# Patient Record
Sex: MALE | Race: WHITE | NOT HISPANIC OR LATINO
[De-identification: names, ages, dates, MRNs, and addresses within clinical notes are randomized per-mention and may not be internally consistent; named-entity substitution may affect disease eponyms.]

---

## 2017-08-22 ENCOUNTER — TRANSCRIPTION ENCOUNTER (OUTPATIENT)
Age: 47
End: 2017-08-22

## 2017-08-24 ENCOUNTER — APPOINTMENT (OUTPATIENT)
Dept: INTERNAL MEDICINE | Facility: CLINIC | Age: 47
End: 2017-08-24
Payer: COMMERCIAL

## 2017-08-24 VITALS — BODY MASS INDEX: 26.28 KG/M2 | WEIGHT: 194 LBS | HEIGHT: 72 IN

## 2017-08-24 PROCEDURE — 36415 COLL VENOUS BLD VENIPUNCTURE: CPT

## 2017-08-24 PROCEDURE — 99214 OFFICE O/P EST MOD 30 MIN: CPT | Mod: 25

## 2017-08-28 ENCOUNTER — APPOINTMENT (OUTPATIENT)
Dept: ULTRASOUND IMAGING | Facility: CLINIC | Age: 47
End: 2017-08-28
Payer: COMMERCIAL

## 2017-08-28 ENCOUNTER — OUTPATIENT (OUTPATIENT)
Dept: OUTPATIENT SERVICES | Facility: HOSPITAL | Age: 47
LOS: 1 days | End: 2017-08-28

## 2017-08-28 PROCEDURE — 76700 US EXAM ABDOM COMPLETE: CPT | Mod: 26

## 2017-08-29 ENCOUNTER — TRANSCRIPTION ENCOUNTER (OUTPATIENT)
Age: 47
End: 2017-08-29

## 2017-08-29 LAB
ALBUMIN SERPL ELPH-MCNC: 4.5 G/DL
ALP BLD-CCNC: 49 U/L
ALT SERPL-CCNC: 94 U/L
AMYLASE/CREAT SERPL: 63 U/L
ANION GAP SERPL CALC-SCNC: 15 MMOL/L
AST SERPL-CCNC: 47 U/L
BASOPHILS # BLD AUTO: 0.04 K/UL
BASOPHILS NFR BLD AUTO: 0.6 %
BILIRUB SERPL-MCNC: 1.4 MG/DL
BUN SERPL-MCNC: 12 MG/DL
CALCIUM SERPL-MCNC: 10.1 MG/DL
CHLORIDE SERPL-SCNC: 101 MMOL/L
CMV IGG SERPL QL: <0.2 U/ML
CMV IGG SERPL-IMP: NEGATIVE
CMV IGM SERPL QL: <8 AU/ML
CMV IGM SERPL QL: NEGATIVE
CO2 SERPL-SCNC: 25 MMOL/L
CREAT SERPL-MCNC: 1.16 MG/DL
EOSINOPHIL # BLD AUTO: 0.27 K/UL
EOSINOPHIL NFR BLD AUTO: 4.3 %
GLUCOSE SERPL-MCNC: 93 MG/DL
H PYLORI AB SER-ACNC: 9.3 UNITS
H PYLORI IGA SER-ACNC: 14 UNITS
HAV IGG+IGM SER QL: REACTIVE
HAV IGM SER QL: NONREACTIVE
HBV SURFACE AB SER QL: REACTIVE
HBV SURFACE AG SER QL: NONREACTIVE
HCT VFR BLD CALC: 45.8 %
HCV AB SER QL: NONREACTIVE
HCV S/CO RATIO: 0.13 S/CO
HETEROPH AB SER QL: NEGATIVE
HGB BLD-MCNC: 14.6 G/DL
IMM GRANULOCYTES NFR BLD AUTO: 0 %
LPL SERPL-CCNC: 47 U/L
LYMPHOCYTES # BLD AUTO: 2.24 K/UL
LYMPHOCYTES NFR BLD AUTO: 35.9 %
MAN DIFF?: NORMAL
MCHC RBC-ENTMCNC: 29.7 PG
MCHC RBC-ENTMCNC: 31.9 GM/DL
MCV RBC AUTO: 93.1 FL
MONOCYTES # BLD AUTO: 0.39 K/UL
MONOCYTES NFR BLD AUTO: 6.3 %
NEUTROPHILS # BLD AUTO: 3.3 K/UL
NEUTROPHILS NFR BLD AUTO: 52.9 %
PLATELET # BLD AUTO: 238 K/UL
POTASSIUM SERPL-SCNC: 4.9 MMOL/L
PROT SERPL-MCNC: 8 G/DL
RBC # BLD: 4.92 M/UL
RBC # FLD: 14.3 %
SODIUM SERPL-SCNC: 141 MMOL/L
WBC # FLD AUTO: 6.24 K/UL

## 2017-09-15 ENCOUNTER — RX RENEWAL (OUTPATIENT)
Age: 47
End: 2017-09-15

## 2017-09-16 ENCOUNTER — MEDICATION RENEWAL (OUTPATIENT)
Age: 47
End: 2017-09-16

## 2017-11-30 ENCOUNTER — RX RENEWAL (OUTPATIENT)
Age: 47
End: 2017-11-30

## 2017-12-05 ENCOUNTER — MESSAGE (OUTPATIENT)
Age: 47
End: 2017-12-05

## 2017-12-13 ENCOUNTER — APPOINTMENT (OUTPATIENT)
Dept: PULMONOLOGY | Facility: CLINIC | Age: 47
End: 2017-12-13
Payer: COMMERCIAL

## 2017-12-13 VITALS
OXYGEN SATURATION: 97 % | DIASTOLIC BLOOD PRESSURE: 60 MMHG | SYSTOLIC BLOOD PRESSURE: 122 MMHG | HEIGHT: 72 IN | BODY MASS INDEX: 26.14 KG/M2 | TEMPERATURE: 96.9 F | WEIGHT: 193 LBS | RESPIRATION RATE: 15 BRPM | HEART RATE: 66 BPM

## 2017-12-13 PROCEDURE — 99243 OFF/OP CNSLTJ NEW/EST LOW 30: CPT | Mod: GC

## 2018-01-03 ENCOUNTER — RESULT REVIEW (OUTPATIENT)
Age: 48
End: 2018-01-03

## 2018-01-03 ENCOUNTER — APPOINTMENT (OUTPATIENT)
Dept: SLEEP CENTER | Facility: CLINIC | Age: 48
End: 2018-01-03
Payer: COMMERCIAL

## 2018-01-03 PROCEDURE — G0399: CPT | Mod: 26

## 2018-01-04 ENCOUNTER — OUTPATIENT (OUTPATIENT)
Dept: OUTPATIENT SERVICES | Facility: HOSPITAL | Age: 48
LOS: 1 days | End: 2018-01-04
Payer: COMMERCIAL

## 2018-01-04 PROCEDURE — G0399: CPT

## 2018-01-08 ENCOUNTER — RESULT REVIEW (OUTPATIENT)
Age: 48
End: 2018-01-08

## 2018-01-09 DIAGNOSIS — G47.33 OBSTRUCTIVE SLEEP APNEA (ADULT) (PEDIATRIC): ICD-10-CM

## 2018-03-23 ENCOUNTER — APPOINTMENT (OUTPATIENT)
Dept: PULMONOLOGY | Facility: CLINIC | Age: 48
End: 2018-03-23
Payer: COMMERCIAL

## 2018-03-23 VITALS
OXYGEN SATURATION: 98 % | DIASTOLIC BLOOD PRESSURE: 70 MMHG | SYSTOLIC BLOOD PRESSURE: 100 MMHG | BODY MASS INDEX: 26.11 KG/M2 | HEIGHT: 73 IN | WEIGHT: 197 LBS | TEMPERATURE: 97.4 F | RESPIRATION RATE: 18 BRPM | HEART RATE: 76 BPM

## 2018-03-23 PROCEDURE — 99214 OFFICE O/P EST MOD 30 MIN: CPT | Mod: GC

## 2018-06-12 ENCOUNTER — RX RENEWAL (OUTPATIENT)
Age: 48
End: 2018-06-12

## 2018-09-06 ENCOUNTER — CHART COPY (OUTPATIENT)
Age: 48
End: 2018-09-06

## 2018-10-09 ENCOUNTER — RX RENEWAL (OUTPATIENT)
Age: 48
End: 2018-10-09

## 2018-10-22 ENCOUNTER — TRANSCRIPTION ENCOUNTER (OUTPATIENT)
Age: 48
End: 2018-10-22

## 2018-12-12 ENCOUNTER — RX RENEWAL (OUTPATIENT)
Age: 48
End: 2018-12-12

## 2018-12-13 RX ORDER — LEVOTHYROXINE SODIUM 0.15 MG/1
150 TABLET ORAL DAILY
Qty: 90 | Refills: 3 | Status: DISCONTINUED | COMMUNITY
Start: 2018-12-12 | End: 2018-12-13

## 2018-12-27 ENCOUNTER — RX RENEWAL (OUTPATIENT)
Age: 48
End: 2018-12-27

## 2019-01-03 ENCOUNTER — RX RENEWAL (OUTPATIENT)
Age: 49
End: 2019-01-03

## 2019-01-08 ENCOUNTER — RX RENEWAL (OUTPATIENT)
Age: 49
End: 2019-01-08

## 2019-01-11 ENCOUNTER — RX RENEWAL (OUTPATIENT)
Age: 49
End: 2019-01-11

## 2019-02-27 ENCOUNTER — MESSAGE (OUTPATIENT)
Age: 49
End: 2019-02-27

## 2019-02-28 ENCOUNTER — TRANSCRIPTION ENCOUNTER (OUTPATIENT)
Age: 49
End: 2019-02-28

## 2019-03-04 LAB
T4 FREE SERPL-MCNC: 1.5 NG/DL
TSH SERPL-ACNC: 3.54 UIU/ML

## 2019-03-20 ENCOUNTER — OTHER (OUTPATIENT)
Age: 49
End: 2019-03-20

## 2019-03-31 ENCOUNTER — LABORATORY RESULT (OUTPATIENT)
Age: 49
End: 2019-03-31

## 2019-04-01 ENCOUNTER — TRANSCRIPTION ENCOUNTER (OUTPATIENT)
Age: 49
End: 2019-04-01

## 2019-04-08 ENCOUNTER — NON-APPOINTMENT (OUTPATIENT)
Age: 49
End: 2019-04-08

## 2019-04-08 ENCOUNTER — APPOINTMENT (OUTPATIENT)
Dept: INTERNAL MEDICINE | Facility: CLINIC | Age: 49
End: 2019-04-08
Payer: COMMERCIAL

## 2019-04-08 VITALS
BODY MASS INDEX: 24.92 KG/M2 | DIASTOLIC BLOOD PRESSURE: 68 MMHG | SYSTOLIC BLOOD PRESSURE: 100 MMHG | HEART RATE: 75 BPM | WEIGHT: 188 LBS | OXYGEN SATURATION: 95 % | TEMPERATURE: 98.6 F | HEIGHT: 73 IN

## 2019-04-08 VITALS — BODY MASS INDEX: 25.19 KG/M2 | WEIGHT: 190.9 LBS

## 2019-04-08 DIAGNOSIS — G47.33 OBSTRUCTIVE SLEEP APNEA (ADULT) (PEDIATRIC): ICD-10-CM

## 2019-04-08 PROCEDURE — 99396 PREV VISIT EST AGE 40-64: CPT | Mod: 25

## 2019-04-08 PROCEDURE — G0444 DEPRESSION SCREEN ANNUAL: CPT

## 2019-04-08 PROCEDURE — 82270 OCCULT BLOOD FECES: CPT

## 2019-04-08 PROCEDURE — 36415 COLL VENOUS BLD VENIPUNCTURE: CPT

## 2019-04-08 RX ORDER — VALACYCLOVIR 500 MG/1
500 TABLET, FILM COATED ORAL
Qty: 10 | Refills: 0 | Status: COMPLETED | COMMUNITY
Start: 2018-12-15 | End: 2019-04-08

## 2019-04-08 RX ORDER — LEVOTHYROXINE SODIUM 0.15 MG/1
150 TABLET ORAL
Qty: 135 | Refills: 2 | Status: COMPLETED | COMMUNITY
Start: 2019-03-24 | End: 2019-04-08

## 2019-04-09 NOTE — HEALTH RISK ASSESSMENT
[Excellent] : ~his/her~  mood as  excellent [No falls in past year] : Patient reported no falls in the past year [0] : 2) Feeling down, depressed, or hopeless: Not at all (0) [HIV test declined] : HIV test declined [Hepatitis C test declined] : Hepatitis C test declined [None] : None [With Family] : lives with family [Employed] : employed [] :  [Sexually Active] : sexually active [Feels Safe at Home] : Feels safe at home [Fully functional (bathing, dressing, toileting, transferring, walking, feeding)] : Fully functional (bathing, dressing, toileting, transferring, walking, feeding) [Fully functional (using the telephone, shopping, preparing meals, housekeeping, doing laundry, using] : Fully functional and needs no help or supervision to perform IADLs (using the telephone, shopping, preparing meals, housekeeping, doing laundry, using transportation, managing medications and managing finances) [Reports changes in hearing] : Reports changes in hearing [Smoke Detector] : smoke detector [Carbon Monoxide Detector] : carbon monoxide detector [Seat Belt] :  uses seat belt [Sunscreen] : uses sunscreen [With Patient/Caregiver] : With Patient/Caregiver [de-identified] : Social EtOH [] : No [[id number]] : 0 [de-identified] : Gym - 4-5x/week, running 2 miles vs gym in warm weather. 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 [de-identified] : No vision correction.  Last eye exam about 2016.  Last visit about 3 years ago. [de-identified] : Going regularly. [AdvancecareDate] : 04/08/2019 [FreeTextEntry4] : Patient reports having a written Health Care Proxy and/or Living Will and will forward a copy.

## 2019-04-09 NOTE — PHYSICAL EXAM
[Well Nourished] : well nourished [No Acute Distress] : no acute distress [Well-Appearing] : well-appearing [Well Developed] : well developed [Normal Voice/Communication] : normal voice/communication [Normal Sclera/Conjunctiva] : normal sclera/conjunctiva [EOMI] : extraocular movements intact [PERRL] : pupils equal round and reactive to light [Normal Outer Ear/Nose] : the outer ears and nose were normal in appearance [Normal TMs] : both tympanic membranes were normal [Normal Oropharynx] : the oropharynx was normal [No JVD] : no jugular venous distention [No Lymphadenopathy] : no lymphadenopathy [Supple] : supple [No Respiratory Distress] : no respiratory distress  [Thyroid Normal, No Nodules] : the thyroid was normal and there were no nodules present [Normal Rate] : normal rate  [No Accessory Muscle Use] : no accessory muscle use [Clear to Auscultation] : lungs were clear to auscultation bilaterally [Normal S1, S2] : normal S1 and S2 [Regular Rhythm] : with a regular rhythm [No Murmur] : no murmur heard [No Abdominal Bruit] : a ~M bruit was not heard ~T in the abdomen [No Varicosities] : no varicosities [No Carotid Bruits] : no carotid bruits [Pedal Pulses Present] : the pedal pulses are present [No Edema] : there was no peripheral edema [No Extremity Clubbing/Cyanosis] : no extremity clubbing/cyanosis [Soft] : abdomen soft [Non Tender] : non-tender [No Palpable Aorta] : no palpable aorta [Non-distended] : non-distended [No HSM] : no HSM [No Masses] : no abdominal mass palpated [No Hernias] : no hernias [Normal Sphincter Tone] : normal sphincter tone [Normal Bowel Sounds] : normal bowel sounds [No Mass] : no mass [Urethral Meatus] : meatus normal [Penis Abnormality] : normal circumcised penis [Urinary Bladder Findings] : the bladder was normal on palpation [Scrotum] : the scrotum was normal [Prostate Enlargement] : the prostate was not enlarged [Testes Mass (___cm)] : there were no testicular masses [No Prostate Nodules] : no prostate nodules [Normal Posterior Cervical Nodes] : no posterior cervical lymphadenopathy [Normal Anterior Cervical Nodes] : no anterior cervical lymphadenopathy [No CVA Tenderness] : no CVA  tenderness [No Spinal Tenderness] : no spinal tenderness [Grossly Normal Strength/Tone] : grossly normal strength/tone [No Joint Swelling] : no joint swelling [Coordination Grossly Intact] : coordination grossly intact [No Rash] : no rash [Normal Gait] : normal gait [Deep Tendon Reflexes (DTR)] : deep tendon reflexes were 2+ and symmetric [No Focal Deficits] : no focal deficits [Normal Affect] : the affect was normal [Normal Insight/Judgement] : insight and judgment were intact [Stool Occult Blood] : stool negative for occult blood

## 2019-04-09 NOTE — ASSESSMENT
[FreeTextEntry1] : (1) HCM - discussed diet, exercise, weight maintenance.  Labs ordered in office as below.  Patient offered HIV testing and declined today (it had been done several years ago and was negative).  Patient received the influenza vaccination this past season.  Tdap UTD from 4/26/13.  Patient reports that he just prepared advance directives, and he will forward a copy to me.\par \par (2) Sleep - patient with h/o sleep apnea and uses CPAP.  Follow-up with Dr. Calzada as directed.\par \par (3) Hypothyroidism - continue levothyroxine.  Patient had been self adjusting the medication, but now has an appointment to see Dr. Michelle Kruger (Endocrine) for further assistance.  Patient's control appears to have been impacted by a change in generic .  He would need to ensure that his pharmacy keeps the  the same, or switch to brand name Synthroid.\par \par (4) Vitamin D insufficiency - continue vitamin D (patient not using it).  Level sent today.

## 2019-04-09 NOTE — HISTORY OF PRESENT ILLNESS
[de-identified] : Patient presents for a follow-up annual physical.\par \par Patient continues to see Dr. Mario Gavin for Trimble's esophagus.  He is on pantoprazole.  He feels that he feels unwell from it.  He also didn't tolerate omeprazole.\par \par Patient notes that his thyroid control was thrown off after the generic maker of his levothyroxine was changed by his pharmacy.  He tried adjusting the medication on his own, and then made an appointment to see an Endocrinologist.\par \par Patient is not taking the vitamin D.

## 2019-04-16 ENCOUNTER — TRANSCRIPTION ENCOUNTER (OUTPATIENT)
Age: 49
End: 2019-04-16

## 2019-04-16 LAB
25(OH)D3 SERPL-MCNC: 24.7 NG/ML
ALBUMIN SERPL ELPH-MCNC: 4.4 G/DL
ALP BLD-CCNC: 49 U/L
ALT SERPL-CCNC: 15 U/L
ANION GAP SERPL CALC-SCNC: 10 MMOL/L
APPEARANCE: CLEAR
AST SERPL-CCNC: 16 U/L
BACTERIA: NEGATIVE
BASOPHILS # BLD AUTO: 0.06 K/UL
BASOPHILS NFR BLD AUTO: 1 %
BILIRUB SERPL-MCNC: 0.6 MG/DL
BILIRUBIN URINE: NEGATIVE
BLOOD URINE: NEGATIVE
BUN SERPL-MCNC: 14 MG/DL
CALCIUM SERPL-MCNC: 9.2 MG/DL
CHLORIDE SERPL-SCNC: 104 MMOL/L
CHOLEST SERPL-MCNC: 143 MG/DL
CHOLEST/HDLC SERPL: 3 RATIO
CO2 SERPL-SCNC: 26 MMOL/L
COLOR: NORMAL
CREAT SERPL-MCNC: 0.96 MG/DL
EOSINOPHIL # BLD AUTO: 0.2 K/UL
EOSINOPHIL NFR BLD AUTO: 3.2 %
ESTIMATED AVERAGE GLUCOSE: 103 MG/DL
GLUCOSE QUALITATIVE U: NEGATIVE
GLUCOSE SERPL-MCNC: 83 MG/DL
HBA1C MFR BLD HPLC: 5.2 %
HCT VFR BLD CALC: 42.6 %
HDLC SERPL-MCNC: 47 MG/DL
HGB BLD-MCNC: 14.1 G/DL
HYALINE CASTS: 0 /LPF
IMM GRANULOCYTES NFR BLD AUTO: 0.2 %
KETONES URINE: NEGATIVE
LDLC SERPL CALC-MCNC: 82 MG/DL
LEUKOCYTE ESTERASE URINE: NEGATIVE
LYMPHOCYTES # BLD AUTO: 1.91 K/UL
LYMPHOCYTES NFR BLD AUTO: 30.5 %
MAN DIFF?: NORMAL
MCHC RBC-ENTMCNC: 29.8 PG
MCHC RBC-ENTMCNC: 33.1 GM/DL
MCV RBC AUTO: 90.1 FL
MICROSCOPIC-UA: NORMAL
MONOCYTES # BLD AUTO: 0.47 K/UL
MONOCYTES NFR BLD AUTO: 7.5 %
NEUTROPHILS # BLD AUTO: 3.61 K/UL
NEUTROPHILS NFR BLD AUTO: 57.6 %
NITRITE URINE: NEGATIVE
PH URINE: 7
PLATELET # BLD AUTO: 239 K/UL
POTASSIUM SERPL-SCNC: 4 MMOL/L
PROT SERPL-MCNC: 7.3 G/DL
PROTEIN URINE: NEGATIVE
RBC # BLD: 4.73 M/UL
RBC # FLD: 13.4 %
RED BLOOD CELLS URINE: 1 /HPF
SODIUM SERPL-SCNC: 140 MMOL/L
SPECIFIC GRAVITY URINE: 1.01
SQUAMOUS EPITHELIAL CELLS: 0 /HPF
T4 FREE SERPL-MCNC: 1.5 NG/DL
TRIGL SERPL-MCNC: 71 MG/DL
TSH SERPL-ACNC: 2.31 UIU/ML
UROBILINOGEN URINE: NORMAL
WBC # FLD AUTO: 6.26 K/UL
WHITE BLOOD CELLS URINE: 0 /HPF

## 2019-04-23 ENCOUNTER — APPOINTMENT (OUTPATIENT)
Dept: ENDOCRINOLOGY | Facility: CLINIC | Age: 49
End: 2019-04-23
Payer: COMMERCIAL

## 2019-04-23 VITALS
HEIGHT: 73 IN | HEART RATE: 73 BPM | WEIGHT: 194 LBS | BODY MASS INDEX: 25.71 KG/M2 | SYSTOLIC BLOOD PRESSURE: 133 MMHG | DIASTOLIC BLOOD PRESSURE: 76 MMHG

## 2019-04-23 DIAGNOSIS — Z86.39 PERSONAL HISTORY OF OTHER ENDOCRINE, NUTRITIONAL AND METABOLIC DISEASE: ICD-10-CM

## 2019-04-23 PROCEDURE — 99204 OFFICE O/P NEW MOD 45 MIN: CPT

## 2019-05-01 ENCOUNTER — RX RENEWAL (OUTPATIENT)
Age: 49
End: 2019-05-01

## 2019-08-05 ENCOUNTER — RX RENEWAL (OUTPATIENT)
Age: 49
End: 2019-08-05

## 2019-10-09 ENCOUNTER — TRANSCRIPTION ENCOUNTER (OUTPATIENT)
Age: 49
End: 2019-10-09

## 2019-10-21 ENCOUNTER — OTHER (OUTPATIENT)
Age: 49
End: 2019-10-21

## 2019-12-04 LAB
T4 FREE SERPL-MCNC: 1.4 NG/DL
TSH SERPL-ACNC: 10.5 UIU/ML

## 2019-12-15 ENCOUNTER — MOBILE ON CALL (OUTPATIENT)
Age: 49
End: 2019-12-15

## 2019-12-16 NOTE — PHYSICAL EXAM
[Alert] : alert [No Acute Distress] : no acute distress [No Proptosis] : no proptosis [Normal Sclera/Conjunctiva] : normal sclera/conjunctiva [Healthy Appearance] : healthy appearance [No Lid Lag] : no lid lag [Normal Oropharynx] : the oropharynx was normal [Supple] : the neck was supple [No Neck Mass] : no neck mass was observed [Thyroid Not Enlarged] : the thyroid was not enlarged [No LAD] : no lymphadenopathy [No Thyroid Nodules] : there were no palpable thyroid nodules [Normal Rate and Effort] : normal respiratory rhythm and effort [Normal S1, S2] : normal S1 and S2 [Normal Rate] : heart rate was normal  [Clear to Auscultation] : lungs were clear to auscultation bilaterally [Regular Rhythm] : with a regular rhythm [No Stigmata of Cushings Syndrome] : no stigmata of cushings syndrome [Normal Gait] : normal gait [Normal Insight/Judgement] : insight and judgment were intact [Kyphosis] : no kyphosis present [Acanthosis Nigricans] : no acanthosis nigricans [de-identified] : no moon facies, no supraclavicular fat pads
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
## 2019-12-16 NOTE — ASSESSMENT
[FreeTextEntry1] : Graves' disease with postablation hypothyroidism. He was diagnosed with Graves' disease in 2002 in the setting of fatigue, weight loss, loose stools, eye complaints. He was on methimazole for many years. He had treatment with radioactive iodine around 2009 with postablation hypothyroidism. He was taking generic levothyroxine 150 mcg daily for many years from [facility]. His pharmacy changed and he was receiving levothyroxine from another  and self-titrating his dose. He recently received a 90 day supply of brand name Synthroid 150 mcg daily from [facility]. He is now clinically and biochemically euthyroid. We will check TSH in 2 months on this regimen. No evidence of active eye disease.

## 2019-12-30 ENCOUNTER — RX RENEWAL (OUTPATIENT)
Age: 49
End: 2019-12-30

## 2020-01-27 ENCOUNTER — LABORATORY RESULT (OUTPATIENT)
Age: 50
End: 2020-01-27

## 2020-01-29 ENCOUNTER — APPOINTMENT (OUTPATIENT)
Dept: ENDOCRINOLOGY | Facility: CLINIC | Age: 50
End: 2020-01-29
Payer: COMMERCIAL

## 2020-01-29 VITALS
DIASTOLIC BLOOD PRESSURE: 79 MMHG | HEIGHT: 73 IN | HEART RATE: 76 BPM | BODY MASS INDEX: 25.84 KG/M2 | WEIGHT: 195 LBS | SYSTOLIC BLOOD PRESSURE: 133 MMHG

## 2020-01-29 DIAGNOSIS — E55.9 VITAMIN D DEFICIENCY, UNSPECIFIED: ICD-10-CM

## 2020-01-29 LAB — TSH SERPL-ACNC: 4.87 UIU/ML

## 2020-01-29 PROCEDURE — 99214 OFFICE O/P EST MOD 30 MIN: CPT

## 2020-01-29 RX ORDER — RABEPRAZOLE SODIUM 20 MG/1
TABLET, DELAYED RELEASE ORAL
Refills: 0 | Status: DISCONTINUED | COMMUNITY
End: 2020-01-29

## 2020-03-04 ENCOUNTER — RX RENEWAL (OUTPATIENT)
Age: 50
End: 2020-03-04

## 2020-04-10 ENCOUNTER — APPOINTMENT (OUTPATIENT)
Dept: INTERNAL MEDICINE | Facility: CLINIC | Age: 50
End: 2020-04-10

## 2020-07-24 ENCOUNTER — RX RENEWAL (OUTPATIENT)
Age: 50
End: 2020-07-24

## 2020-08-07 LAB
25(OH)D3 SERPL-MCNC: 31.8 NG/ML
TSH SERPL-ACNC: 1.39 UIU/ML

## 2020-08-07 NOTE — HISTORY OF PRESENT ILLNESS
[FreeTextEntry1] : Mr. Landrum is a 49 year-old man with a history of Graves' disease with postablation hypothyroidism presenting for follow-up. I saw him for an initial visit in April 2019. His wife is a physician at [facility].\par \par Graves' disease with postablation hypothyroidism.\par He was diagnosed with Graves' disease in 2002 in the setting of fatigue, weight loss, loose stools, eye complaints. He was on methimazole for many years. He had treatment with radioactive iodine around 2009 with postablation hypothyroidism.\par He never required specific treatment for Graves' eye disease.\par He was taking generic levothyroxine 150 mcg daily for many years from [facility], changed to brand name Synthroid 150 mcg daily in April 2019. We adjusted his dose to 175 mcg daily in December 2019.\par His mother had a history of Graves' disease.\par \par Interim History \par TSH 10.50 uIU/mL in December 2019 on Synthroid 175 mcg daily. Recent TSH 4.87 uIU/mL on this dose.\par He feels well today. \par Medical and surgical history, medications, allergies, social and family history reviewed and updated as needed.

## 2020-08-07 NOTE — PHYSICAL EXAM
[Alert] : alert [Healthy Appearance] : healthy appearance [No Acute Distress] : no acute distress [No Lid Lag] : no lid lag [Normal Sclera/Conjunctiva] : normal sclera/conjunctiva [No Proptosis] : no proptosis [No Neck Mass] : no neck mass was observed [Normal Oropharynx] : the oropharynx was normal [Supple] : the neck was supple [Thyroid Not Enlarged] : the thyroid was not enlarged [No LAD] : no lymphadenopathy [No Thyroid Nodules] : there were no palpable thyroid nodules [Normal Rate and Effort] : normal respiratory rhythm and effort [Normal Rate] : heart rate was normal  [Clear to Auscultation] : lungs were clear to auscultation bilaterally [Regular Rhythm] : with a regular rhythm [Normal S1, S2] : normal S1 and S2 [Normal Gait] : normal gait [No Stigmata of Cushings Syndrome] : no stigmata of cushings syndrome [Normal Insight/Judgement] : insight and judgment were intact [Kyphosis] : no kyphosis present [Acanthosis Nigricans] : no acanthosis nigricans [de-identified] : no moon facies, no supraclavicular fat pads

## 2020-08-07 NOTE — ADDENDUM
[FreeTextEntry1] : Recent laboratory results as below; discussed with Mr. Landrum. TSH and 25-hydroxyvitamin D within range. We will continue his current regimen as above. 8/07/20

## 2020-08-07 NOTE — ASSESSMENT
[FreeTextEntry1] : Graves' disease with postablation hypothyroidism. He had treatment with radioactive iodine around 2009 with subsequent postablation hypothyroidism. No evidence of active eye disease. He was taking generic levothyroxine 150 mcg daily for many years from [facility], changed to brand name Synthroid 150 mg daily in April 2019. We adjusted his dose to 175 mcg daily in December 2019. He is clinically euthyroid but recent TSH not at goal. We reviewed proper use and compliance with levothyroxine. We will adjust Synthroid to 175 mcg, 1 pill 6 days/week, 1.5 pills 1 day/week. We will check TSH in 6-8 week on this regimen. \par \par Vitamin D deficiency. Check 25-hydroxyvitamin D with next blood tests.

## 2020-09-15 ENCOUNTER — TRANSCRIPTION ENCOUNTER (OUTPATIENT)
Age: 50
End: 2020-09-15

## 2020-10-06 ENCOUNTER — APPOINTMENT (OUTPATIENT)
Dept: INTERNAL MEDICINE | Facility: CLINIC | Age: 50
End: 2020-10-06

## 2020-10-22 ENCOUNTER — LABORATORY RESULT (OUTPATIENT)
Age: 50
End: 2020-10-22

## 2020-10-22 ENCOUNTER — NON-APPOINTMENT (OUTPATIENT)
Age: 50
End: 2020-10-22

## 2020-10-22 ENCOUNTER — APPOINTMENT (OUTPATIENT)
Dept: INTERNAL MEDICINE | Facility: CLINIC | Age: 50
End: 2020-10-22
Payer: COMMERCIAL

## 2020-10-22 VITALS
BODY MASS INDEX: 25.71 KG/M2 | OXYGEN SATURATION: 98 % | TEMPERATURE: 95.7 F | WEIGHT: 194 LBS | SYSTOLIC BLOOD PRESSURE: 130 MMHG | DIASTOLIC BLOOD PRESSURE: 77 MMHG | HEART RATE: 60 BPM | HEIGHT: 73 IN

## 2020-10-22 VITALS — DIASTOLIC BLOOD PRESSURE: 72 MMHG | SYSTOLIC BLOOD PRESSURE: 126 MMHG

## 2020-10-22 DIAGNOSIS — Z87.898 PERSONAL HISTORY OF OTHER SPECIFIED CONDITIONS: ICD-10-CM

## 2020-10-22 DIAGNOSIS — Z83.49 FAMILY HISTORY OF OTHER ENDOCRINE, NUTRITIONAL AND METABOLIC DISEASES: ICD-10-CM

## 2020-10-22 DIAGNOSIS — Z78.9 OTHER SPECIFIED HEALTH STATUS: ICD-10-CM

## 2020-10-22 PROCEDURE — 99072 ADDL SUPL MATRL&STAF TM PHE: CPT

## 2020-10-22 PROCEDURE — 99396 PREV VISIT EST AGE 40-64: CPT | Mod: 25

## 2020-10-22 PROCEDURE — 36415 COLL VENOUS BLD VENIPUNCTURE: CPT

## 2020-10-22 PROCEDURE — 93000 ELECTROCARDIOGRAM COMPLETE: CPT

## 2020-10-22 NOTE — HISTORY OF PRESENT ILLNESS
[de-identified] : 49 y/o man presents for CPE and to establish care.\par H/o Graves s/p ZARATE. \par H/o sleep apnea- uses CPAP at 11.\par H/o Trimble's -most recent endoscopy clear.

## 2020-10-22 NOTE — HEALTH RISK ASSESSMENT
[Good] : ~his/her~  mood as  good [No falls in past year] : Patient reported no falls in the past year [0] : 2) Feeling down, depressed, or hopeless: Not at all (0) [[id number]] : 0 [Change in mental status noted] : No change in mental status noted [Language] : denies difficulty with language [Behavior] : denies difficulty with behavior [Learning/Retaining New Information] : denies difficulty learning/retaining new information [Handling Complex Tasks] : denies difficulty handling complex tasks [Reasoning] : denies difficulty with reasoning [Spatial Ability and Orientation] : denies difficulty with spatial ability and orientation [None] : None [With Family] : lives with family [# of Members in Household ___] :  household currently consist of [unfilled] member(s) [Employed] : employed [] :  [# Of Children ___] : has [unfilled] children [Sexually Active] : sexually active [High Risk Behavior] : no high risk behavior [Feels Safe at Home] : Feels safe at home [Fully functional (bathing, dressing, toileting, transferring, walking, feeding)] : Fully functional (bathing, dressing, toileting, transferring, walking, feeding) [Fully functional (using the telephone, shopping, preparing meals, housekeeping, doing laundry, using] : Fully functional and needs no help or supervision to perform IADLs (using the telephone, shopping, preparing meals, housekeeping, doing laundry, using transportation, managing medications and managing finances) [Smoke Detector] : smoke detector [Seat Belt] :  uses seat belt [Sunscreen] : uses sunscreen

## 2020-10-22 NOTE — ASSESSMENT
[FreeTextEntry1] : 50 year is here for a CPE. He was counselled on diet and exercise, drug and alcohol use, age appropriate health care maintenance including vaccines, seatbelts, sunscreen, stress reduction and safe sex. All questions asked/answered to the best of my ability.\par

## 2020-10-23 LAB
25(OH)D3 SERPL-MCNC: 26.2 NG/ML
ALBUMIN SERPL ELPH-MCNC: 4.6 G/DL
ALP BLD-CCNC: 50 U/L
ALT SERPL-CCNC: 21 U/L
ANION GAP SERPL CALC-SCNC: 11 MMOL/L
AST SERPL-CCNC: 20 U/L
BASOPHILS # BLD AUTO: 0.06 K/UL
BASOPHILS NFR BLD AUTO: 1.2 %
BILIRUB SERPL-MCNC: 1.3 MG/DL
BUN SERPL-MCNC: 12 MG/DL
CALCIUM SERPL-MCNC: 9.3 MG/DL
CHLORIDE SERPL-SCNC: 104 MMOL/L
CHOLEST SERPL-MCNC: 174 MG/DL
CO2 SERPL-SCNC: 26 MMOL/L
CREAT SERPL-MCNC: 1.08 MG/DL
EOSINOPHIL # BLD AUTO: 0.18 K/UL
EOSINOPHIL NFR BLD AUTO: 3.7 %
GLUCOSE SERPL-MCNC: 85 MG/DL
HCT VFR BLD CALC: 43.8 %
HDLC SERPL-MCNC: 56 MG/DL
HGB BLD-MCNC: 14.4 G/DL
IMM GRANULOCYTES NFR BLD AUTO: 0.2 %
LDLC SERPL CALC-MCNC: 109 MG/DL
LYMPHOCYTES # BLD AUTO: 1.78 K/UL
LYMPHOCYTES NFR BLD AUTO: 37 %
MAN DIFF?: NORMAL
MCHC RBC-ENTMCNC: 29.4 PG
MCHC RBC-ENTMCNC: 32.9 GM/DL
MCV RBC AUTO: 89.4 FL
MONOCYTES # BLD AUTO: 0.32 K/UL
MONOCYTES NFR BLD AUTO: 6.7 %
NEUTROPHILS # BLD AUTO: 2.46 K/UL
NEUTROPHILS NFR BLD AUTO: 51.2 %
NONHDLC SERPL-MCNC: 119 MG/DL
PLATELET # BLD AUTO: 214 K/UL
POTASSIUM SERPL-SCNC: 4.4 MMOL/L
PROT SERPL-MCNC: 7.3 G/DL
PSA FREE SERPL-MCNC: 0.1 NG/ML
RBC # BLD: 4.9 M/UL
RBC # FLD: 12.7 %
SODIUM SERPL-SCNC: 141 MMOL/L
TRIGL SERPL-MCNC: 50 MG/DL
TSH SERPL-ACNC: 2.07 UIU/ML
WBC # FLD AUTO: 4.81 K/UL

## 2021-03-23 LAB
ALBUMIN SERPL ELPH-MCNC: 4.5 G/DL
ALP BLD-CCNC: 56 U/L
ALT SERPL-CCNC: 16 U/L
ANION GAP SERPL CALC-SCNC: 8 MMOL/L
AST SERPL-CCNC: 16 U/L
BASOPHILS # BLD AUTO: 0.06 K/UL
BASOPHILS NFR BLD AUTO: 0.8 %
BILIRUB SERPL-MCNC: 1.2 MG/DL
BUN SERPL-MCNC: 14 MG/DL
CALCIUM SERPL-MCNC: 10 MG/DL
CHLORIDE SERPL-SCNC: 101 MMOL/L
CO2 SERPL-SCNC: 31 MMOL/L
CREAT SERPL-MCNC: 1 MG/DL
EOSINOPHIL # BLD AUTO: 0.14 K/UL
EOSINOPHIL NFR BLD AUTO: 1.9 %
GLUCOSE SERPL-MCNC: 83 MG/DL
HCT VFR BLD CALC: 44.8 %
HGB BLD-MCNC: 14.8 G/DL
IMM GRANULOCYTES NFR BLD AUTO: 0.3 %
LYMPHOCYTES # BLD AUTO: 1.57 K/UL
LYMPHOCYTES NFR BLD AUTO: 21.4 %
MAN DIFF?: NORMAL
MCHC RBC-ENTMCNC: 29.8 PG
MCHC RBC-ENTMCNC: 33 GM/DL
MCV RBC AUTO: 90.1 FL
MONOCYTES # BLD AUTO: 0.53 K/UL
MONOCYTES NFR BLD AUTO: 7.2 %
NEUTROPHILS # BLD AUTO: 5 K/UL
NEUTROPHILS NFR BLD AUTO: 68.4 %
PLATELET # BLD AUTO: 231 K/UL
POTASSIUM SERPL-SCNC: 4.6 MMOL/L
PROT SERPL-MCNC: 7.6 G/DL
RBC # BLD: 4.97 M/UL
RBC # FLD: 12.8 %
SODIUM SERPL-SCNC: 140 MMOL/L
T4 FREE SERPL-MCNC: 1.6 NG/DL
TSH SERPL-ACNC: 2.44 UIU/ML
WBC # FLD AUTO: 7.32 K/UL

## 2021-07-06 ENCOUNTER — APPOINTMENT (OUTPATIENT)
Dept: MRI IMAGING | Facility: CLINIC | Age: 51
End: 2021-07-06

## 2021-07-06 ENCOUNTER — APPOINTMENT (OUTPATIENT)
Dept: RADIOLOGY | Facility: CLINIC | Age: 51
End: 2021-07-06

## 2021-08-22 ENCOUNTER — APPOINTMENT (OUTPATIENT)
Dept: MRI IMAGING | Facility: CLINIC | Age: 51
End: 2021-08-22

## 2021-09-14 ENCOUNTER — RESULT REVIEW (OUTPATIENT)
Age: 51
End: 2021-09-14

## 2021-09-27 ENCOUNTER — APPOINTMENT (OUTPATIENT)
Dept: SURGERY | Facility: CLINIC | Age: 51
End: 2021-09-27
Payer: COMMERCIAL

## 2021-09-27 VITALS
OXYGEN SATURATION: 100 % | WEIGHT: 187 LBS | SYSTOLIC BLOOD PRESSURE: 128 MMHG | HEART RATE: 57 BPM | TEMPERATURE: 97.4 F | HEIGHT: 73 IN | BODY MASS INDEX: 24.78 KG/M2 | DIASTOLIC BLOOD PRESSURE: 78 MMHG | RESPIRATION RATE: 17 BRPM

## 2021-09-27 DIAGNOSIS — Z80.3 FAMILY HISTORY OF MALIGNANT NEOPLASM OF BREAST: ICD-10-CM

## 2021-09-27 PROCEDURE — 99204 OFFICE O/P NEW MOD 45 MIN: CPT

## 2021-09-27 RX ORDER — PANTOPRAZOLE 20 MG/1
20 TABLET, DELAYED RELEASE ORAL TWICE DAILY
Qty: 180 | Refills: 3 | Status: DISCONTINUED | COMMUNITY
Start: 2021-08-09 | End: 2021-09-27

## 2021-09-27 RX ORDER — SUCRALFATE 1 G/10ML
1 SUSPENSION ORAL 4 TIMES DAILY
Qty: 1200 | Refills: 1 | Status: DISCONTINUED | COMMUNITY
Start: 2021-08-05 | End: 2021-09-27

## 2021-09-27 RX ORDER — PANTOPRAZOLE 40 MG/1
TABLET, DELAYED RELEASE ORAL
Refills: 0 | Status: DISCONTINUED | COMMUNITY
End: 2021-09-27

## 2021-09-27 NOTE — ASSESSMENT
[FreeTextEntry1] : 51-year-old male with the above history given the functional finding of a nonvisualization of the gallbladder as well as the sludge in the gallbladder and his symptoms of epigastric pain radiating to the back associated with food with a positive family history all of this points to biliary tract disease we have recommended a laparoscopic cholecystectomy the risk benefits and expectations were discussed at length with the patient all of his questions were answered patient also has comorbid conditions of sleep apnea treated with CPAP

## 2021-09-27 NOTE — HISTORY OF PRESENT ILLNESS
[de-identified] : Aaron is a 52 y/o male here for a consultation. \par Abdominal US from 8/28/17 demonstrates cholelithiasis without sonographic evidence of cholecystitis. Multiple gallbladder cholesterol polyps. Mild hepatomegaly and hepatic steatosis. \par US abdomen 9/14/21- sludge filling the gallbladder lumen. gallbladder is not fully distended. \par Hepatobiliary scan 9/21/21- nonvisualization of gallbladder, activity is not seen in the gallbladder on images obtained up to 4 hours from injection time. this is consistent with cystic duct obstruction as described above. the biliary to bowel transit time is within normal limits. \par \par Today pt reports feeling well, no pain. Pt reports RUQ pain started in July, worse at night and after eating fatty foods. BMs daily, good appetite, no nausea, no vomiting.  [de-identified] : 51-year-old male here for evaluation for gallstones. Patient is otherwise healthy other than reflux and newly diagnosis of Trimble's. The patient has been having dyspepsia for some time recent endoscopy revealed evidence of possible Trimble's minimal inflammation sonogram revealed the 3 mm gallbladder wall no pericholecystic fluid with sludge HIDA scan was obtained which revealed no filling of the gallbladder but good flow into the duodenum currently the patient is feeling well he has modified his diet and he is here for evaluation for laparoscopic cholecystectomy his father also had biliary tract disease LFTs are within normal limits

## 2021-09-27 NOTE — PHYSICAL EXAM
[Normal Breath Sounds] : Normal breath sounds [Normal Heart Sounds] : normal heart sounds [No Rash or Lesion] : No rash or lesion [Alert] : alert [Oriented to Person] : oriented to person [Oriented to Place] : oriented to place [Oriented to Time] : oriented to time [Calm] : calm [JVD] : no jugular venous distention  [No HSM] : no hepatosplenomegaly [Tender] : was nontender [de-identified] : WNL [de-identified] : WNL [de-identified] : Soft nontender nondistended no hernias or masses are present [de-identified] : ROM WNL

## 2021-10-02 ENCOUNTER — APPOINTMENT (OUTPATIENT)
Dept: MRI IMAGING | Facility: CLINIC | Age: 51
End: 2021-10-02
Payer: COMMERCIAL

## 2021-10-02 ENCOUNTER — OUTPATIENT (OUTPATIENT)
Dept: OUTPATIENT SERVICES | Facility: HOSPITAL | Age: 51
LOS: 1 days | End: 2021-10-02

## 2021-10-02 PROCEDURE — 73221 MRI JOINT UPR EXTREM W/O DYE: CPT | Mod: 26,RT

## 2021-10-02 NOTE — ADDENDUM
ACUTE UPPER RESPIRATORY INFECTION  Cool mist humidified air decreases nasal congestion, loosens/thins mucus and provides moisture to throat tissue (humidifier/vaporizer)  Clean humidifier/vaporizer per ’s instructions   Nasacort  Or Nasonex Nasal spray per pkg to decrease nasal congestion   PROVIDER DEMONSTRATED APPROPRIATE USE OF NASAL SPRAY  Increase oral fluid intake (2-3 liters a day) to promote hydration and sinus drainage.  Avoid alcohol, caffeine and limit dairy products to avoid dehydration and worsening of mucus production.  Avoid sharing eating and drinking utensils  May take over-the-counter Samantha Vitamin C- 250 mg ( daily x 1 wk; then reduce dose to 100 mg a day) or packet of Emergen-C (250 packet daily x 1 wk then reduce to 100 mg packet daily) to facilitate healing   May elevate head on 2-3 pillows while reclining and at night to promote sinus drainage and decreases night cough.  May take over-the-counter Acetaminophen (Tylenol) or Ibuprofen (Advil, Motrin with food) for fever or pain per package instructions.  Cough into elbow  May continue to administer Muicnex DM at bedtime to decrease night cough & loosen mucus  (Take with a full glass of water)  Frequent Hand washing  Discard toothbrush and obtain new one after symptoms resolve  If symptoms worsen- increased fever (102 degrees or greater), vomiting, nausea, drooling, stiffness of neck, difficulty swallowing, persistent cough with thick yellow mucus, wheezing, tightness in chest, upper/lower back pain, or difficulty breathing GO IMMEDIATELY TO ER OR URGENT CARE    HYPERTENSION  Follow-up with your primary MD within 1-2 weeks OR [facility] Pharmacist for BP recheck  SEEK THE ADVICE OF PHARMACIST OR MD BEFORE ADMINISTERING OVER THE COUNTER MEDICATIONS D/T DIAGNOSIS OF HIGH BLOOD PRESSURE AND RISK OF INCREASING BLOOD PRESSURE  Monitor blood pressure daily, log results and take to PCP every visit  Follow up with ER IMMEDIATELY if:  [FreeTextEntry1] : Mr. Landrum has been taking Synthroid 150 mcg daily, with recent TSH 10.50 uIU/mL. We will adjust his dose to 175 mcg daily, with plan to repeat TSH in 6-8 weeks. Prescription sent to pharmacy. 12/16/19 difficulty breathing, chest/back pain, pain radiating down left arm, difficulty speaking/ slurred speech, visual changes, severe headache, numbness/tingling  in hands/arms/legs     Pt counseled on illness process, medication/s use & when to seek emergency care vs PCP follow up.  Pt verbalizes understanding of  discharge instructions and counseled to contact [phone] for questions/concerns regarding this visit

## 2021-10-08 ENCOUNTER — OUTPATIENT (OUTPATIENT)
Dept: OUTPATIENT SERVICES | Facility: HOSPITAL | Age: 51
LOS: 1 days | End: 2021-10-08
Payer: COMMERCIAL

## 2021-10-08 VITALS
TEMPERATURE: 98 F | HEART RATE: 60 BPM | WEIGHT: 188.94 LBS | OXYGEN SATURATION: 98 % | HEIGHT: 73 IN | DIASTOLIC BLOOD PRESSURE: 80 MMHG | RESPIRATION RATE: 16 BRPM | SYSTOLIC BLOOD PRESSURE: 132 MMHG

## 2021-10-08 DIAGNOSIS — Z98.890 OTHER SPECIFIED POSTPROCEDURAL STATES: Chronic | ICD-10-CM

## 2021-10-08 DIAGNOSIS — K81.1 CHRONIC CHOLECYSTITIS: ICD-10-CM

## 2021-10-08 DIAGNOSIS — Z01.818 ENCOUNTER FOR OTHER PREPROCEDURAL EXAMINATION: ICD-10-CM

## 2021-10-08 DIAGNOSIS — G47.33 OBSTRUCTIVE SLEEP APNEA (ADULT) (PEDIATRIC): ICD-10-CM

## 2021-10-08 DIAGNOSIS — Z90.49 ACQUIRED ABSENCE OF OTHER SPECIFIED PARTS OF DIGESTIVE TRACT: Chronic | ICD-10-CM

## 2021-10-08 LAB
ALBUMIN SERPL ELPH-MCNC: 4.7 G/DL — SIGNIFICANT CHANGE UP (ref 3.3–5)
ALP SERPL-CCNC: 54 U/L — SIGNIFICANT CHANGE UP (ref 40–120)
ALT FLD-CCNC: 18 U/L — SIGNIFICANT CHANGE UP (ref 10–45)
ANION GAP SERPL CALC-SCNC: 13 MMOL/L — SIGNIFICANT CHANGE UP (ref 5–17)
AST SERPL-CCNC: 16 U/L — SIGNIFICANT CHANGE UP (ref 10–40)
BILIRUB SERPL-MCNC: 1.3 MG/DL — HIGH (ref 0.2–1.2)
BUN SERPL-MCNC: 15 MG/DL — SIGNIFICANT CHANGE UP (ref 7–23)
CALCIUM SERPL-MCNC: 9.4 MG/DL — SIGNIFICANT CHANGE UP (ref 8.4–10.5)
CHLORIDE SERPL-SCNC: 102 MMOL/L — SIGNIFICANT CHANGE UP (ref 96–108)
CO2 SERPL-SCNC: 24 MMOL/L — SIGNIFICANT CHANGE UP (ref 22–31)
CREAT SERPL-MCNC: 0.98 MG/DL — SIGNIFICANT CHANGE UP (ref 0.5–1.3)
GLUCOSE SERPL-MCNC: 93 MG/DL — SIGNIFICANT CHANGE UP (ref 70–99)
HCT VFR BLD CALC: 42.7 % — SIGNIFICANT CHANGE UP (ref 39–50)
HGB BLD-MCNC: 14.8 G/DL — SIGNIFICANT CHANGE UP (ref 13–17)
MCHC RBC-ENTMCNC: 30 PG — SIGNIFICANT CHANGE UP (ref 27–34)
MCHC RBC-ENTMCNC: 34.7 GM/DL — SIGNIFICANT CHANGE UP (ref 32–36)
MCV RBC AUTO: 86.6 FL — SIGNIFICANT CHANGE UP (ref 80–100)
NRBC # BLD: 0 /100 WBCS — SIGNIFICANT CHANGE UP (ref 0–0)
PLATELET # BLD AUTO: 228 K/UL — SIGNIFICANT CHANGE UP (ref 150–400)
POTASSIUM SERPL-MCNC: 4.2 MMOL/L — SIGNIFICANT CHANGE UP (ref 3.5–5.3)
POTASSIUM SERPL-SCNC: 4.2 MMOL/L — SIGNIFICANT CHANGE UP (ref 3.5–5.3)
PROT SERPL-MCNC: 7.7 G/DL — SIGNIFICANT CHANGE UP (ref 6–8.3)
RBC # BLD: 4.93 M/UL — SIGNIFICANT CHANGE UP (ref 4.2–5.8)
RBC # FLD: 12.3 % — SIGNIFICANT CHANGE UP (ref 10.3–14.5)
SODIUM SERPL-SCNC: 139 MMOL/L — SIGNIFICANT CHANGE UP (ref 135–145)
WBC # BLD: 4.73 K/UL — SIGNIFICANT CHANGE UP (ref 3.8–10.5)
WBC # FLD AUTO: 4.73 K/UL — SIGNIFICANT CHANGE UP (ref 3.8–10.5)

## 2021-10-08 PROCEDURE — 80053 COMPREHEN METABOLIC PANEL: CPT

## 2021-10-08 PROCEDURE — G0463: CPT

## 2021-10-08 PROCEDURE — 85027 COMPLETE CBC AUTOMATED: CPT

## 2021-10-08 PROCEDURE — 36415 COLL VENOUS BLD VENIPUNCTURE: CPT

## 2021-10-08 RX ORDER — LIDOCAINE HCL 20 MG/ML
0.2 VIAL (ML) INJECTION ONCE
Refills: 0 | Status: DISCONTINUED | OUTPATIENT
Start: 2021-11-04 | End: 2021-11-18

## 2021-10-08 RX ORDER — SODIUM CHLORIDE 9 MG/ML
3 INJECTION INTRAMUSCULAR; INTRAVENOUS; SUBCUTANEOUS EVERY 8 HOURS
Refills: 0 | Status: DISCONTINUED | OUTPATIENT
Start: 2021-11-04 | End: 2021-11-18

## 2021-10-08 NOTE — H&P PST ADULT - GUM GEN PE MLT EXAM PC
Strep test is negative.  Nausea likely related to fluconazole, should improve once treatment is completed.  Sore throat may be related to allergies?  Try daily antihistamine like allegra, zyrtec, or claritin once a day (may tae 2 weeks to see results) or flonase/nasocort steroid nasal spray.  Consider humidifier at night.  Follow up if not improving in 2 weeks.  
patient refused

## 2021-10-08 NOTE — H&P PST ADULT - HISTORY OF PRESENT ILLNESS
50 yo M with h/o GERD, hypothyroidism, RANI on CPAP c/o right UQ abdominal discomfort since 7/2021. Pt had GI consult- s/p endoscopy/colonoscopy, abdominal sonogram/HIDA scan revealed chronic cholecystitis-scheduled for laparoscopic cholecystectomy on 10/21/21.Pt denies any fever, chills, recent travel or Covid 19 related infections  **Received 2 doses of Covid 19 vaccine  ** Covid 19 PCR on 10/18/21

## 2021-10-08 NOTE — H&P PST ADULT - NSICDXFAMILYHX_GEN_ALL_CORE_FT
FAMILY HISTORY:  Father  Still living? Yes, Estimated age:   Family history of valvular heart disease, Age at diagnosis: Age Unknown    Mother  Still living? No  Family history of ischemic bowel disease, Age at diagnosis: Age Unknown

## 2021-10-08 NOTE — H&P PST ADULT - NSICDXPASTMEDICALHX_GEN_ALL_CORE_FT
PAST MEDICAL HISTORY:  GERD (gastroesophageal reflux disease)     Hypothyroidism     RANI on CPAP

## 2021-10-08 NOTE — H&P PST ADULT - NSICDXPASTSURGICALHX_GEN_ALL_CORE_FT
PAST SURGICAL HISTORY:  H/O left wrist surgery 2001    History of ankle surgery right 2010    History of appendectomy 2009

## 2021-10-08 NOTE — H&P PST ADULT - PAIN CHRONIC, PROFILE
PULMONARY CONSULT NOTE      CHERELLE BEGUM  MRN-[id number]    Patient is a 72y old  Female who presents with a chief complaint of Shortness of breath (27 Sep 2018 15:33)      HISTORY OF PRESENT ILLNESS:    The patient has a h/o COPD, ex-smoker of 1-2 ppd x 40 years but quit 1 year ago, h/o AF and reported dementia who p/w severe SOB. She also has a h/o PVD, HTN, DVT and was BIBA to ED from home with daughter-in-law due to severe shortness of breath that began yesterday and worsened early this morning. Per daughter, the patient has had a nonproductive cough for the past few days and occasional shortness of breath that improved with albuterol nebulizers at home. The patient was seen by her PCP yesterday and prescribed antibiotics and steroids that she was unable to  due to the pharmacy closing early. The patient and daughter deny recent fevers, chest pain, swelling, n/v/d, problems with urination. Patient's  is currently home with URI symptoms. She is not on home oxygen and has never been intubated. She has a history of DVTs and had an unsuccessful left femoral stenting procedure 1 month ago. She is on aspirin and Plavix. Patient currently altered, per daughter-in-law this is secondary to patient taking Seroquel at 5am for anxiety. She required brief BiPAP therapy but now is only on NCO2. She c/o cough productive of purulent sputum but does feel better.    MEDICATIONS  (STANDING):  ALBUTerol    90 MICROgram(s) HFA Inhaler 1 Puff(s) Inhalation every 4 hours  ALBUTerol/ipratropium for Nebulization 3 milliLiter(s) Nebulizer every 6 hours  aspirin enteric coated 81 milliGRAM(s) Oral daily  atorvastatin 10 milliGRAM(s) Oral at bedtime  brimonidine 0.2% Ophthalmic Solution 1 Drop(s) Both EYES daily  clopidogrel Tablet 75 milliGRAM(s) Oral daily  diltiazem    Tablet 120 milliGRAM(s) Oral daily  enoxaparin Injectable 40 milliGRAM(s) SubCutaneous daily  latanoprost 0.005% Ophthalmic Solution 1 Drop(s) Both EYES at bedtime  levoFLOXacin IVPB      levoFLOXacin IVPB 500 milliGRAM(s) IV Intermittent once  methylPREDNISolone sodium succinate Injectable 40 milliGRAM(s) IV Push three times a day  saccharomyces boulardii 250 milliGRAM(s) Oral two times a day  timolol 0.5% Solution 1 Drop(s) Both EYES daily  tiotropium 18 MICROgram(s) Capsule 1 Capsule(s) Inhalation daily  traZODone 50 milliGRAM(s) Oral at bedtime      MEDICATIONS  (PRN):      Allergies    Pen-V (Anaphylaxis)    Intolerances    SEND HEALTH SHAKE TID- RD OKAY (Unknown)      PAST MEDICAL & SURGICAL HISTORY:  Glaucoma  PVD (peripheral vascular disease)  COPD (chronic obstructive pulmonary disease)  Chronic atrial fibrillation  No significant past surgical history      FAMILY HISTORY:  Family history of COPD (chronic obstructive pulmonary disease) (Father)      SOCIAL HISTORY  Smoking History: as above    REVIEW OF SYSTEMS:    CONSTITUTIONAL:  No fevers, chills, sweats    HEENT:  Eyes:  No diplopia or blurred vision. ENT:  No earache, sore throat or runny nose.    CARDIOVASCULAR:  No pressure, squeezing, tightness, or heaviness about the chest; no palpitations.    RESPIRATORY:  Per HPI    GASTROINTESTINAL:  No abdominal pain, nausea, vomiting or diarrhea.    GENITOURINARY:  No dysuria, frequency or urgency.    NEUROLOGIC:  No paresthesias, fasciculations, seizures or weakness.    PSYCHIATRIC:  Somewhat confused at time.    Vital Signs Last 24 Hrs  T(C): 36.3 (27 Sep 2018 15:27), Max: 36.3 (27 Sep 2018 06:40)  T(F): 97.4 (27 Sep 2018 15:27), Max: 97.4 (27 Sep 2018 15:27)  HR: 95 (27 Sep 2018 15:27) (83 - 95)  BP: 155/88 (27 Sep 2018 15:27) (116/80 - 159/70)  BP(mean): --  RR: 24 (27 Sep 2018 15:27) (22 - 28)  SpO2: 99% (27 Sep 2018 15:27) (97% - 100%)    PHYSICAL EXAMINATION:    GENERAL: The patient is a well-developed, well-nourished female in no apparent distress.     HEENT: Head is normocephalic and atraumatic. Extraocular muscles are intact. Mucous membranes are moist.     NECK: Supple.     LUNGS: Clear to auscultation without wheezing, rales, or rhonchi. Respirations unlabored    HEART: Irregular rhythm without murmur.    ABDOMEN: Soft, nontender, and nondistended.  No hepatosplenomegaly is noted.    EXTREMITIES: Without any cyanosis, clubbing, rash, lesions or edema. toe amputation    NEUROLOGIC: Grossly intact.      LABS:                        12.2   14.2  )-----------( 485      ( 27 Sep 2018 07:25 )             38.6     09-27    143  |  103  |  18.0  ----------------------------<  113  3.7   |  27.0  |  0.64    Ca    9.8      27 Sep 2018 07:25  Mg     2.2     09-27    TPro  7.8  /  Alb  4.0  /  TBili  0.3<L>  /  DBili  x   /  AST  15  /  ALT  12  /  AlkPhos  93  09-27    PT/INR - ( 27 Sep 2018 07:25 )   PT: 11.5 sec;   INR: 1.04 ratio         PTT - ( 27 Sep 2018 07:25 )  PTT:35.1 sec    ABG - ( 27 Sep 2018 06:59 )  pH, Arterial: 7.38  pH, Blood: x     /  pCO2: 46    /  pO2: 224   / HCO3: 26    / Base Excess: 1.2   /  SaO2: 100               CARDIAC MARKERS ( 27 Sep 2018 07:25 )  x     / 0.01 ng/mL / x     / x     / x            Serum Pro-Brain Natriuretic Peptide: 127 pg/mL (09-27-18 @ 07:25)        RADIOLOGY & ADDITIONAL STUDIES:     EXAM:  US DPLX LWR EXT VEINS COMPL BI                          PROCEDURE DATE:  09/27/2018          INTERPRETATION:  Ultrasound of the lower extremity deep venous system         CLINICAL INFORMATION:  LEFT lower extremity pain., evaluate for deep   venous thrombosis.    TECHNIQUE:   Duplex ultrasonography with color and spectral doppler of   the bilateral lower extremity deep venous system was performed.    FINDINGS:    No previous examinations are  available for review.    The bilateral lowerextremity deep venous system demonstrated no   abnormality.  The veins were patent with intact Doppler flow.  The flow   varied with respiration and augmented with distal calf compression.  The   veins were directly compressible by the ultrasound transducer.       The veins evaluated included the common femoral vein, the inflow of the   greater saphenous vein, the inflow of the deep femoral vein, the   superficial femoral vein, the popliteal vein and posterior tibial veins.        IMPRESSION:   Unremarkable ultrasound of the bilateral lower extremity   deep venous system.       Additional comment: LEFT femoral artery is occluded .                KIT HAIR M.D., ATTENDING RADIOLOGIST  This document has been electronically signed. Sep [id number] 10:19AM             EXAM:  CT ANGIO CHEST (W)AW IC                          PROCEDURE DATE:  09/27/2018          INTERPRETATION:  EXAM: CTA CHEST WITH CONTRAST.     CLINICAL INDICATION: Evaluate for pulmonary embolism. History of DVT.     TECHNIQUE: Imaging was performed following the administration of   intravenous contrast. 73 mL of Omnipaque 350 was utilized for contrast   enhancement and 27 mL was discarded. Axial, sagittal, coronal and MIP   scans are reviewed.     PRIOR EXAM: None.     FINDINGS:      Cervicothoracic junction and axillary regions are unremarkable. No   endotracheal or central endobronchial lesion is seen. There is no hilar   or mediastinal lymphadenopathy. Ascending thoracic aorta is dilated   measuring 3.4 cm. There is no aortic dissection. A small pericardial   effusion is seen.    There is no evidence of pulmonary embolism.    There are reticular opacities in both lung bases suggesting subsegmental   atelectasis/scarring. No pulmonary consolidation, lung mass, pleural   effusion or pneumothorax is seen.    Visualized upper abdomen is unremarkable. There is evidence of age   indeterminate endplate compressions of T8 and L1. No significant   retropulsion is seen.    Probable moderate degree of stenosis is seen at the origin of left renal   artery.      IMPRESSION:     No evidence of pulmonary embolism.    Additional findings as above.                  RAYMON AMOR M.D., ATTENDING RADIOLOGIST  This document has been electronically signed. Sep 27 2018  9:49AM no

## 2021-10-11 PROBLEM — G47.33 OBSTRUCTIVE SLEEP APNEA (ADULT) (PEDIATRIC): Chronic | Status: ACTIVE | Noted: 2021-10-08

## 2021-10-11 PROBLEM — K21.9 GASTRO-ESOPHAGEAL REFLUX DISEASE WITHOUT ESOPHAGITIS: Chronic | Status: ACTIVE | Noted: 2021-10-08

## 2021-10-11 PROBLEM — E03.9 HYPOTHYROIDISM, UNSPECIFIED: Chronic | Status: ACTIVE | Noted: 2021-10-08

## 2021-10-18 ENCOUNTER — OUTPATIENT (OUTPATIENT)
Dept: OUTPATIENT SERVICES | Facility: HOSPITAL | Age: 51
LOS: 1 days | End: 2021-10-18
Payer: COMMERCIAL

## 2021-10-18 DIAGNOSIS — Z98.890 OTHER SPECIFIED POSTPROCEDURAL STATES: Chronic | ICD-10-CM

## 2021-10-18 DIAGNOSIS — Z90.49 ACQUIRED ABSENCE OF OTHER SPECIFIED PARTS OF DIGESTIVE TRACT: Chronic | ICD-10-CM

## 2021-10-18 DIAGNOSIS — Z11.52 ENCOUNTER FOR SCREENING FOR COVID-19: ICD-10-CM

## 2021-10-18 LAB — SARS-COV-2 RNA SPEC QL NAA+PROBE: SIGNIFICANT CHANGE UP

## 2021-10-18 PROCEDURE — C9803: CPT

## 2021-10-18 PROCEDURE — U0003: CPT

## 2021-10-18 PROCEDURE — U0005: CPT

## 2021-11-01 ENCOUNTER — OUTPATIENT (OUTPATIENT)
Dept: OUTPATIENT SERVICES | Facility: HOSPITAL | Age: 51
LOS: 1 days | End: 2021-11-01
Payer: COMMERCIAL

## 2021-11-01 DIAGNOSIS — Z98.890 OTHER SPECIFIED POSTPROCEDURAL STATES: Chronic | ICD-10-CM

## 2021-11-01 DIAGNOSIS — Z90.49 ACQUIRED ABSENCE OF OTHER SPECIFIED PARTS OF DIGESTIVE TRACT: Chronic | ICD-10-CM

## 2021-11-01 DIAGNOSIS — Z11.52 ENCOUNTER FOR SCREENING FOR COVID-19: ICD-10-CM

## 2021-11-01 LAB — SARS-COV-2 RNA SPEC QL NAA+PROBE: SIGNIFICANT CHANGE UP

## 2021-11-01 PROCEDURE — U0003: CPT

## 2021-11-01 PROCEDURE — U0005: CPT

## 2021-11-01 PROCEDURE — C9803: CPT

## 2021-11-03 ENCOUNTER — TRANSCRIPTION ENCOUNTER (OUTPATIENT)
Age: 51
End: 2021-11-03

## 2021-11-04 ENCOUNTER — OUTPATIENT (OUTPATIENT)
Dept: OUTPATIENT SERVICES | Facility: HOSPITAL | Age: 51
LOS: 1 days | End: 2021-11-04
Payer: COMMERCIAL

## 2021-11-04 ENCOUNTER — RESULT REVIEW (OUTPATIENT)
Age: 51
End: 2021-11-04

## 2021-11-04 ENCOUNTER — APPOINTMENT (OUTPATIENT)
Dept: SURGERY | Facility: HOSPITAL | Age: 51
End: 2021-11-04
Payer: COMMERCIAL

## 2021-11-04 VITALS
WEIGHT: 188.94 LBS | HEIGHT: 73 IN | TEMPERATURE: 99 F | RESPIRATION RATE: 16 BRPM | OXYGEN SATURATION: 100 % | SYSTOLIC BLOOD PRESSURE: 133 MMHG | HEART RATE: 75 BPM | DIASTOLIC BLOOD PRESSURE: 75 MMHG

## 2021-11-04 VITALS
RESPIRATION RATE: 19 BRPM | TEMPERATURE: 97 F | HEART RATE: 67 BPM | SYSTOLIC BLOOD PRESSURE: 126 MMHG | DIASTOLIC BLOOD PRESSURE: 75 MMHG | OXYGEN SATURATION: 99 %

## 2021-11-04 DIAGNOSIS — Z90.49 ACQUIRED ABSENCE OF OTHER SPECIFIED PARTS OF DIGESTIVE TRACT: Chronic | ICD-10-CM

## 2021-11-04 DIAGNOSIS — K81.1 CHRONIC CHOLECYSTITIS: ICD-10-CM

## 2021-11-04 DIAGNOSIS — Z98.890 OTHER SPECIFIED POSTPROCEDURAL STATES: Chronic | ICD-10-CM

## 2021-11-04 PROCEDURE — 47562 LAPAROSCOPIC CHOLECYSTECTOMY: CPT

## 2021-11-04 PROCEDURE — 88304 TISSUE EXAM BY PATHOLOGIST: CPT

## 2021-11-04 PROCEDURE — C9399: CPT

## 2021-11-04 PROCEDURE — 88304 TISSUE EXAM BY PATHOLOGIST: CPT | Mod: 26

## 2021-11-04 RX ORDER — OXYCODONE HYDROCHLORIDE 5 MG/1
5 TABLET ORAL ONCE
Refills: 0 | Status: DISCONTINUED | OUTPATIENT
Start: 2021-11-04 | End: 2021-11-04

## 2021-11-04 RX ORDER — ONDANSETRON 8 MG/1
4 TABLET, FILM COATED ORAL ONCE
Refills: 0 | Status: COMPLETED | OUTPATIENT
Start: 2021-11-04 | End: 2021-11-04

## 2021-11-04 RX ORDER — FENTANYL CITRATE 50 UG/ML
25 INJECTION INTRAVENOUS
Refills: 0 | Status: DISCONTINUED | OUTPATIENT
Start: 2021-11-04 | End: 2021-11-04

## 2021-11-04 RX ORDER — PANTOPRAZOLE SODIUM 20 MG/1
1 TABLET, DELAYED RELEASE ORAL
Qty: 0 | Refills: 0 | DISCHARGE

## 2021-11-04 RX ORDER — CEFAZOLIN SODIUM 1 G
2000 VIAL (EA) INJECTION ONCE
Refills: 0 | Status: COMPLETED | OUTPATIENT
Start: 2021-11-04 | End: 2021-11-04

## 2021-11-04 RX ORDER — LEVOTHYROXINE SODIUM 125 MCG
1 TABLET ORAL
Qty: 0 | Refills: 0 | DISCHARGE

## 2021-11-04 RX ORDER — CHLORHEXIDINE GLUCONATE 213 G/1000ML
1 SOLUTION TOPICAL ONCE
Refills: 0 | Status: COMPLETED | OUTPATIENT
Start: 2021-11-04 | End: 2021-11-04

## 2021-11-04 RX ORDER — TRAMADOL HYDROCHLORIDE 50 MG/1
1 TABLET ORAL
Qty: 12 | Refills: 0
Start: 2021-11-04

## 2021-11-04 RX ADMIN — FENTANYL CITRATE 25 MICROGRAM(S): 50 INJECTION INTRAVENOUS at 09:47

## 2021-11-04 RX ADMIN — OXYCODONE HYDROCHLORIDE 5 MILLIGRAM(S): 5 TABLET ORAL at 10:53

## 2021-11-04 RX ADMIN — SODIUM CHLORIDE 3 MILLILITER(S): 9 INJECTION INTRAMUSCULAR; INTRAVENOUS; SUBCUTANEOUS at 05:38

## 2021-11-04 RX ADMIN — CHLORHEXIDINE GLUCONATE 1 APPLICATION(S): 213 SOLUTION TOPICAL at 06:05

## 2021-11-04 RX ADMIN — ONDANSETRON 4 MILLIGRAM(S): 8 TABLET, FILM COATED ORAL at 10:23

## 2021-11-04 RX ADMIN — OXYCODONE HYDROCHLORIDE 5 MILLIGRAM(S): 5 TABLET ORAL at 10:23

## 2021-11-04 RX ADMIN — FENTANYL CITRATE 25 MICROGRAM(S): 50 INJECTION INTRAVENOUS at 09:32

## 2021-11-04 NOTE — ASU DISCHARGE PLAN (ADULT/PEDIATRIC) - ASU DC SPECIAL INSTRUCTIONSFT
Please follow up with Dr. Moore in 7-10 days.   You can shower and gently rinse the incision sites, but no bathing.   Avoid stenous activity for 6 weeks.   You can take tylenol as needed for pain. You can take tramadol for severe pain not controlled by tylenol.

## 2021-11-04 NOTE — ASU DISCHARGE PLAN (ADULT/PEDIATRIC) - CARE PROVIDER_API CALL
Miguelito Moore)  Surgery  [address], [address]  [place] [address]  Phone: [phone]  Fax: [phone]  Follow Up Time: 1 week

## 2021-11-15 LAB — SURGICAL PATHOLOGY STUDY: SIGNIFICANT CHANGE UP

## 2021-11-19 ENCOUNTER — APPOINTMENT (OUTPATIENT)
Dept: SURGERY | Facility: CLINIC | Age: 51
End: 2021-11-19
Payer: COMMERCIAL

## 2021-11-19 VITALS
HEART RATE: 68 BPM | OXYGEN SATURATION: 98 % | RESPIRATION RATE: 16 BRPM | TEMPERATURE: 97.9 F | DIASTOLIC BLOOD PRESSURE: 74 MMHG | SYSTOLIC BLOOD PRESSURE: 133 MMHG

## 2021-11-19 PROCEDURE — 99024 POSTOP FOLLOW-UP VISIT: CPT

## 2021-11-19 NOTE — HISTORY OF PRESENT ILLNESS
[de-identified] : Aaron is a 50 y/o male here for a post op visit following a [facility] ronaldo on 11/4/21.  Patient is doing very well he is back to his daily activities he is tolerating a diet moving his bowels he denies fever or chills and his abdominal pain is improving

## 2021-11-19 NOTE — PHYSICAL EXAM
[JVD] : no jugular venous distention  [Normal Breath Sounds] : Normal breath sounds [Normal Heart Sounds] : normal heart sounds [Abdominal Masses] : No abdominal masses [Abdomen Tenderness] : ~T ~M No abdominal tenderness [Calm] : calm [de-identified] : Ambulating without difficulty [de-identified] : Within normal limits nonicteric [de-identified] : Soft nontender nondistended mild ecchymosis in the right lateral 5 mm port area [de-identified] : Full range of motion [de-identified] : Cranial nerves II through XII grossly intact

## 2021-11-19 NOTE — ASSESSMENT
[FreeTextEntry1] : 51-year-old male status post laparoscopic cholecystectomy he is doing very well postoperatively he is back to his daily activities mild ecchymosis is improving on his right lateral flank secondary to the 5 mm port site otherwise his wounds of healed up nicely pathology was given to the patient as well as an operative report pathology reveals chronic cholecystitis all of his questions were answered.

## 2021-12-01 ENCOUNTER — NON-APPOINTMENT (OUTPATIENT)
Age: 51
End: 2021-12-01

## 2021-12-01 ENCOUNTER — APPOINTMENT (OUTPATIENT)
Dept: INTERNAL MEDICINE | Facility: CLINIC | Age: 51
End: 2021-12-01
Payer: COMMERCIAL

## 2021-12-01 VITALS
OXYGEN SATURATION: 98 % | TEMPERATURE: 97.7 F | WEIGHT: 191 LBS | DIASTOLIC BLOOD PRESSURE: 84 MMHG | SYSTOLIC BLOOD PRESSURE: 118 MMHG | HEART RATE: 67 BPM | HEIGHT: 73 IN | BODY MASS INDEX: 25.31 KG/M2

## 2021-12-01 DIAGNOSIS — Z09 ENCOUNTER FOR FOLLOW-UP EXAMINATION AFTER COMPLETED TREATMENT FOR CONDITIONS OTHER THAN MALIGNANT NEOPLASM: ICD-10-CM

## 2021-12-01 DIAGNOSIS — Z87.19 PERSONAL HISTORY OF OTHER DISEASES OF THE DIGESTIVE SYSTEM: ICD-10-CM

## 2021-12-01 PROCEDURE — 99396 PREV VISIT EST AGE 40-64: CPT | Mod: 25

## 2021-12-01 PROCEDURE — G0442 ANNUAL ALCOHOL SCREEN 15 MIN: CPT | Mod: NC

## 2021-12-01 PROCEDURE — 93000 ELECTROCARDIOGRAM COMPLETE: CPT | Mod: 59

## 2021-12-01 PROCEDURE — 36415 COLL VENOUS BLD VENIPUNCTURE: CPT

## 2021-12-01 NOTE — HISTORY OF PRESENT ILLNESS
[de-identified] : 52 y/o man presents for CPE. Seen once before in 2020.\par H/o Graves s/p ZARATE. \par H/o sleep apnea- uses CPAP at 11.\par H/o Trimble's -most recent endoscopy clear. \par Had recent ronaldo for chronic stones. Feels great. \par \par Had MODERNA x 3.\par Had influenza vaccine.\par

## 2021-12-01 NOTE — ASSESSMENT
[FreeTextEntry1] : 51 year is here for a CPE. He was counselled on diet and exercise, drug and alcohol use, age appropriate health care maintenance including vaccines, seatbelts, sunscreen, stress reduction and safe sex. All questions asked/answered to the best of my ability.\par LAbs sent.\par Will consider shingrix.\par f/u GI.

## 2021-12-01 NOTE — HEALTH RISK ASSESSMENT
[Good] : ~his/her~  mood as  good [No falls in past year] : Patient reported no falls in the past year [0] : 2) Feeling down, depressed, or hopeless: Not at all (0) [PHQ-2 Negative - No further assessment needed] : PHQ-2 Negative - No further assessment needed [Yes] : Yes [Monthly or less (1 pt)] : Monthly or less (1 point) [1 or 2 (0 pts)] : 1 or 2 (0 points) [Never (0 pts)] : Never (0 points) [No] : In the past 12 months have you used drugs other than those required for medical reasons? No [Patient reported colonoscopy was normal] : Patient reported colonoscopy was normal [HIV test declined] : HIV test declined [Hepatitis C test declined] : Hepatitis C test declined [None] : None [With Family] : lives with family [Employed] : employed [] :  [Fully functional (bathing, dressing, toileting, transferring, walking, feeding)] : Fully functional (bathing, dressing, toileting, transferring, walking, feeding) [Fully functional (using the telephone, shopping, preparing meals, housekeeping, doing laundry, using] : Fully functional and needs no help or supervision to perform IADLs (using the telephone, shopping, preparing meals, housekeeping, doing laundry, using transportation, managing medications and managing finances) [Seat Belt] :  uses seat belt [Sunscreen] : uses sunscreen [] : No [Audit-CScore] : 0 [[id number]] : 0 [Change in mental status noted] : No change in mental status noted [Language] : denies difficulty with language [Behavior] : denies difficulty with behavior [Learning/Retaining New Information] : denies difficulty learning/retaining new information [Handling Complex Tasks] : denies difficulty handling complex tasks [Reasoning] : denies difficulty with reasoning [Spatial Ability and Orientation] : denies difficulty with spatial ability and orientation [Reports changes in hearing] : Reports no changes in hearing [Reports changes in vision] : Reports no changes in vision [Reports changes in dental health] : Reports no changes in dental health [ColonoscopyDate] : 07/21

## 2021-12-01 NOTE — PHYSICAL EXAM
[No Acute Distress] : no acute distress [Well Nourished] : well nourished [Well Developed] : well developed [Well-Appearing] : well-appearing [Normal Sclera/Conjunctiva] : normal sclera/conjunctiva [PERRL] : pupils equal round and reactive to light [EOMI] : extraocular movements intact [Normal Outer Ear/Nose] : the outer ears and nose were normal in appearance [Normal Oropharynx] : the oropharynx was normal [No JVD] : no jugular venous distention [No Lymphadenopathy] : no lymphadenopathy [Supple] : supple [Thyroid Normal, No Nodules] : the thyroid was normal and there were no nodules present [No Respiratory Distress] : no respiratory distress  [No Accessory Muscle Use] : no accessory muscle use [Clear to Auscultation] : lungs were clear to auscultation bilaterally [Normal Rate] : normal rate  [Regular Rhythm] : with a regular rhythm [Normal S1, S2] : normal S1 and S2 [No Murmur] : no murmur heard [No Carotid Bruits] : no carotid bruits [No Abdominal Bruit] : a ~M bruit was not heard ~T in the abdomen [No Varicosities] : no varicosities [Pedal Pulses Present] : the pedal pulses are present [No Edema] : there was no peripheral edema [No Palpable Aorta] : no palpable aorta [No Extremity Clubbing/Cyanosis] : no extremity clubbing/cyanosis [Soft] : abdomen soft [Non Tender] : non-tender [Non-distended] : non-distended [No Masses] : no abdominal mass palpated [No HSM] : no HSM [Normal Bowel Sounds] : normal bowel sounds [Normal Posterior Cervical Nodes] : no posterior cervical lymphadenopathy [Normal Anterior Cervical Nodes] : no anterior cervical lymphadenopathy [No CVA Tenderness] : no CVA  tenderness [No Spinal Tenderness] : no spinal tenderness [No Joint Swelling] : no joint swelling [Grossly Normal Strength/Tone] : grossly normal strength/tone [No Rash] : no rash [Coordination Grossly Intact] : coordination grossly intact [No Focal Deficits] : no focal deficits [Normal Gait] : normal gait [Deep Tendon Reflexes (DTR)] : deep tendon reflexes were 2+ and symmetric [Normal Affect] : the affect was normal [Normal Insight/Judgement] : insight and judgment were intact [Declined Rectal Exam] : declined rectal exam [Alert and Oriented x3] : oriented to person, place, and time [de-identified] : scars healing well

## 2021-12-02 LAB
25(OH)D3 SERPL-MCNC: 25.2 NG/ML
ALBUMIN SERPL ELPH-MCNC: 4.6 G/DL
ALP BLD-CCNC: 62 U/L
ALT SERPL-CCNC: 33 U/L
ANION GAP SERPL CALC-SCNC: 15 MMOL/L
AST SERPL-CCNC: 23 U/L
BASOPHILS # BLD AUTO: 0.07 K/UL
BASOPHILS NFR BLD AUTO: 1.3 %
BILIRUB SERPL-MCNC: 1.3 MG/DL
BUN SERPL-MCNC: 11 MG/DL
CALCIUM SERPL-MCNC: 9.4 MG/DL
CHLORIDE SERPL-SCNC: 103 MMOL/L
CHOLEST SERPL-MCNC: 182 MG/DL
CO2 SERPL-SCNC: 23 MMOL/L
CREAT SERPL-MCNC: 0.93 MG/DL
EOSINOPHIL # BLD AUTO: 0.33 K/UL
EOSINOPHIL NFR BLD AUTO: 6.3 %
ESTIMATED AVERAGE GLUCOSE: 103 MG/DL
GLUCOSE SERPL-MCNC: 82 MG/DL
HBA1C MFR BLD HPLC: 5.2 %
HCT VFR BLD CALC: 43.3 %
HDLC SERPL-MCNC: 59 MG/DL
HGB BLD-MCNC: 14.5 G/DL
IMM GRANULOCYTES NFR BLD AUTO: 0.2 %
LDLC SERPL CALC-MCNC: 113 MG/DL
LYMPHOCYTES # BLD AUTO: 1.68 K/UL
LYMPHOCYTES NFR BLD AUTO: 32.2 %
MAN DIFF?: NORMAL
MCHC RBC-ENTMCNC: 29.8 PG
MCHC RBC-ENTMCNC: 33.5 GM/DL
MCV RBC AUTO: 88.9 FL
MONOCYTES # BLD AUTO: 0.4 K/UL
MONOCYTES NFR BLD AUTO: 7.7 %
NEUTROPHILS # BLD AUTO: 2.73 K/UL
NEUTROPHILS NFR BLD AUTO: 52.3 %
NONHDLC SERPL-MCNC: 123 MG/DL
PLATELET # BLD AUTO: 219 K/UL
POTASSIUM SERPL-SCNC: 4.8 MMOL/L
PROT SERPL-MCNC: 7.5 G/DL
PSA FREE SERPL-MCNC: 0.08 NG/ML
RBC # BLD: 4.87 M/UL
RBC # FLD: 13.2 %
SODIUM SERPL-SCNC: 140 MMOL/L
TRIGL SERPL-MCNC: 51 MG/DL
TSH SERPL-ACNC: 3.49 UIU/ML
VIT B12 SERPL-MCNC: 1292 PG/ML
WBC # FLD AUTO: 5.22 K/UL

## 2021-12-23 ENCOUNTER — APPOINTMENT (OUTPATIENT)
Dept: INFECTIOUS DISEASE | Facility: CLINIC | Age: 51
End: 2021-12-23

## 2021-12-24 LAB — SARS-COV-2 N GENE NPH QL NAA+PROBE: NOT DETECTED

## 2021-12-31 LAB — SARS-COV-2 N GENE NPH QL NAA+PROBE: NOT DETECTED

## 2022-03-01 ENCOUNTER — RESULT CHARGE (OUTPATIENT)
Age: 52
End: 2022-03-01

## 2022-03-02 ENCOUNTER — NON-APPOINTMENT (OUTPATIENT)
Age: 52
End: 2022-03-02

## 2022-03-02 ENCOUNTER — APPOINTMENT (OUTPATIENT)
Dept: OPHTHALMOLOGY | Facility: CLINIC | Age: 52
End: 2022-03-02
Payer: COMMERCIAL

## 2022-03-02 PROCEDURE — 92004 COMPRE OPH EXAM NEW PT 1/>: CPT

## 2022-07-06 ENCOUNTER — APPOINTMENT (OUTPATIENT)
Dept: INTERNAL MEDICINE | Facility: CLINIC | Age: 52
End: 2022-07-06

## 2022-07-06 VITALS
DIASTOLIC BLOOD PRESSURE: 88 MMHG | OXYGEN SATURATION: 98 % | HEART RATE: 78 BPM | HEIGHT: 73 IN | WEIGHT: 197 LBS | BODY MASS INDEX: 26.11 KG/M2 | SYSTOLIC BLOOD PRESSURE: 112 MMHG | TEMPERATURE: 97.2 F

## 2022-07-06 DIAGNOSIS — Z86.19 PERSONAL HISTORY OF OTHER INFECTIOUS AND PARASITIC DISEASES: ICD-10-CM

## 2022-07-06 PROCEDURE — 36415 COLL VENOUS BLD VENIPUNCTURE: CPT

## 2022-07-06 PROCEDURE — 99213 OFFICE O/P EST LOW 20 MIN: CPT | Mod: 25

## 2022-07-06 RX ORDER — PANTOPRAZOLE 20 MG/1
20 TABLET, DELAYED RELEASE ORAL TWICE DAILY
Qty: 60 | Refills: 1 | Status: COMPLETED | COMMUNITY
Start: 2021-08-09 | End: 2022-07-06

## 2022-07-06 NOTE — HISTORY OF PRESENT ILLNESS
[de-identified] : 52 y/o man with hypothyroidism presents for f/u.\par Last seen 12/21. Labs good at that time. \par No changes to health.\par No symptoms of thyroid dsuease.\par GERD controlled.

## 2022-07-06 NOTE — PHYSICAL EXAM
[Normal] : no jugular venous distention, supple, no lymphadenopathy and the thyroid was normal and there were no nodules present [No Respiratory Distress] : no respiratory distress  [No Accessory Muscle Use] : no accessory muscle use [Normal Affect] : the affect was normal [Alert and Oriented x3] : oriented to person, place, and time

## 2022-07-06 NOTE — HEALTH RISK ASSESSMENT
[0] : 2) Feeling down, depressed, or hopeless: Not at all (0) [PHQ-2 Negative - No further assessment needed] : PHQ-2 Negative - No further assessment needed [[id number]] : 0

## 2022-07-07 LAB
ALBUMIN SERPL ELPH-MCNC: 4.5 G/DL
ALP BLD-CCNC: 50 U/L
ALT SERPL-CCNC: 25 U/L
ANION GAP SERPL CALC-SCNC: 13 MMOL/L
AST SERPL-CCNC: 21 U/L
BILIRUB SERPL-MCNC: 1.5 MG/DL
BUN SERPL-MCNC: 15 MG/DL
CALCIUM SERPL-MCNC: 9.9 MG/DL
CHLORIDE SERPL-SCNC: 101 MMOL/L
CO2 SERPL-SCNC: 24 MMOL/L
CREAT SERPL-MCNC: 0.95 MG/DL
EGFR: 98 ML/MIN/1.73M2
GLUCOSE SERPL-MCNC: 74 MG/DL
POTASSIUM SERPL-SCNC: 4.4 MMOL/L
PROT SERPL-MCNC: 7.3 G/DL
SODIUM SERPL-SCNC: 138 MMOL/L
TSH SERPL-ACNC: 2.91 UIU/ML
VIT B12 SERPL-MCNC: 684 PG/ML

## 2023-02-13 RX ORDER — OSELTAMIVIR PHOSPHATE 75 MG/1
75 CAPSULE ORAL TWICE DAILY
Qty: 10 | Refills: 0 | Status: COMPLETED | COMMUNITY
Start: 2022-11-01 | End: 2023-02-13

## 2023-03-05 NOTE — H&P PST ADULT - TEMPERATURE IN CELSIUS (DEGREES C)
[ x ]  Lab studies personally reviewed  [ x ]  Radiology personally reviewed  [ x ]  Old records personally reviewed    58 year old female, with past history significant for Hypertension, presented to the ED secondary to chest pain and shortness of breath.  Seen and evaluated at bedside;    Vital signs upon ED presentation as follows: BP = 200/117, HR = 89, RR = 25, T = 36.7 C (98 F), O2 Sat = 98% on RA.  Diagnosed with Chest Pain, Shortness of breath, Headache and Elevated Blood Pressure Reading in the ED. [ x ]  Lab studies personally reviewed  [ x ]  Radiology personally reviewed  [ x ]  Old records personally reviewed    58 year old female, with past history significant for Hypertension, and Smoking, presented to the ED secondary to chest pain and shortness of breath.  Diagnosed with Chest Pain, Shortness of breath, Headache and Elevated Blood Pressure Reading in the ED. 36.6

## 2023-03-08 ENCOUNTER — NON-APPOINTMENT (OUTPATIENT)
Age: 53
End: 2023-03-08

## 2023-03-08 ENCOUNTER — APPOINTMENT (OUTPATIENT)
Dept: OPHTHALMOLOGY | Facility: CLINIC | Age: 53
End: 2023-03-08
Payer: COMMERCIAL

## 2023-03-08 PROCEDURE — 92014 COMPRE OPH EXAM EST PT 1/>: CPT

## 2023-03-14 ENCOUNTER — APPOINTMENT (OUTPATIENT)
Dept: INTERNAL MEDICINE | Facility: CLINIC | Age: 53
End: 2023-03-14
Payer: COMMERCIAL

## 2023-03-14 ENCOUNTER — NON-APPOINTMENT (OUTPATIENT)
Age: 53
End: 2023-03-14

## 2023-03-14 VITALS
WEIGHT: 190 LBS | BODY MASS INDEX: 25.18 KG/M2 | TEMPERATURE: 97.1 F | SYSTOLIC BLOOD PRESSURE: 125 MMHG | HEIGHT: 73 IN | DIASTOLIC BLOOD PRESSURE: 80 MMHG | OXYGEN SATURATION: 100 % | HEART RATE: 60 BPM

## 2023-03-14 DIAGNOSIS — H53.69 OTHER NIGHT BLINDNESS: ICD-10-CM

## 2023-03-14 DIAGNOSIS — Z00.00 ENCOUNTER FOR GENERAL ADULT MEDICAL EXAMINATION W/OUT ABNORMAL FINDINGS: ICD-10-CM

## 2023-03-14 PROCEDURE — 36415 COLL VENOUS BLD VENIPUNCTURE: CPT

## 2023-03-14 PROCEDURE — 93000 ELECTROCARDIOGRAM COMPLETE: CPT

## 2023-03-14 PROCEDURE — 99396 PREV VISIT EST AGE 40-64: CPT | Mod: 25

## 2023-03-14 NOTE — REVIEW OF SYSTEMS
[Negative] : Heme/Lymph [Joint Pain] : no joint pain [Joint Stiffness] : no joint stiffness [Back Pain] : no back pain [Joint Swelling] : no joint swelling [FreeTextEntry9] : raynauds

## 2023-03-14 NOTE — ASSESSMENT
[FreeTextEntry1] : 52 year is here for a CPE. He was counselled on diet and exercise, drug and alcohol use, age appropriate health care maintenance including vaccines, seatbelts, sunscreen, stress reduction and safe sex. All questions asked/answered to the best of my ability.\par Labs sent.\par Warming techiniques discussed. Decline CCB. \par

## 2023-03-14 NOTE — HEALTH RISK ASSESSMENT
[0] : 2) Feeling down, depressed, or hopeless: Not at all (0) [PHQ-2 Negative - No further assessment needed] : PHQ-2 Negative - No further assessment needed [Never] : Never [[id number]] : 0 [Patient reported colonoscopy was normal] : Patient reported colonoscopy was normal [HIV test declined] : HIV test declined [Hepatitis C test declined] : Hepatitis C test declined [Change in mental status noted] : No change in mental status noted [Language] : denies difficulty with language [Behavior] : denies difficulty with behavior [Learning/Retaining New Information] : denies difficulty learning/retaining new information [Handling Complex Tasks] : denies difficulty handling complex tasks [Reasoning] : denies difficulty with reasoning [Spatial Ability and Orientation] : denies difficulty with spatial ability and orientation [None] : None [With Family] : lives with family [Employed] : employed [] :  [Sexually Active] : sexually active [High Risk Behavior] : no high risk behavior [Feels Safe at Home] : Feels safe at home [Fully functional (bathing, dressing, toileting, transferring, walking, feeding)] : Fully functional (bathing, dressing, toileting, transferring, walking, feeding) [Fully functional (using the telephone, shopping, preparing meals, housekeeping, doing laundry, using] : Fully functional and needs no help or supervision to perform IADLs (using the telephone, shopping, preparing meals, housekeeping, doing laundry, using transportation, managing medications and managing finances) [Reports changes in hearing] : Reports no changes in hearing [Reports changes in vision] : Reports changes in vision [Reports changes in dental health] : Reports no changes in dental health [Smoke Detector] : smoke detector [Safety elements used in home] : safety elements used in home [Seat Belt] :  uses seat belt [ColonoscopyDate] : 2021

## 2023-03-14 NOTE — HISTORY OF PRESENT ILLNESS
[de-identified] : 51 y/o man presents for CPE.\par H/o Graves s/p ZARATE. Has developed mild Raynauds of middle finger. Response to warming with gloves or warm water.\par H/o sleep apnea- uses CPAP.\par H/o Trimble's -most recent endoscopy clear. \par

## 2023-03-14 NOTE — PHYSICAL EXAM
[No Acute Distress] : no acute distress [Well Nourished] : well nourished [Well Developed] : well developed [Well-Appearing] : well-appearing [Normal Sclera/Conjunctiva] : normal sclera/conjunctiva [PERRL] : pupils equal round and reactive to light [EOMI] : extraocular movements intact [Normal Outer Ear/Nose] : the outer ears and nose were normal in appearance [Normal Oropharynx] : the oropharynx was normal [No JVD] : no jugular venous distention [No Lymphadenopathy] : no lymphadenopathy [Supple] : supple [Thyroid Normal, No Nodules] : the thyroid was normal and there were no nodules present [No Respiratory Distress] : no respiratory distress  [No Accessory Muscle Use] : no accessory muscle use [Clear to Auscultation] : lungs were clear to auscultation bilaterally [Normal Rate] : normal rate  [Regular Rhythm] : with a regular rhythm [Normal S1, S2] : normal S1 and S2 [No Murmur] : no murmur heard [No Carotid Bruits] : no carotid bruits [No Abdominal Bruit] : a ~M bruit was not heard ~T in the abdomen [No Varicosities] : no varicosities [Pedal Pulses Present] : the pedal pulses are present [No Edema] : there was no peripheral edema [No Palpable Aorta] : no palpable aorta [No Extremity Clubbing/Cyanosis] : no extremity clubbing/cyanosis [Soft] : abdomen soft [Non Tender] : non-tender [Non-distended] : non-distended [No Masses] : no abdominal mass palpated [No HSM] : no HSM [Normal Bowel Sounds] : normal bowel sounds [Normal Sphincter Tone] : normal sphincter tone [No Mass] : no mass [Normal Posterior Cervical Nodes] : no posterior cervical lymphadenopathy [Normal Anterior Cervical Nodes] : no anterior cervical lymphadenopathy [No CVA Tenderness] : no CVA  tenderness [No Spinal Tenderness] : no spinal tenderness [No Joint Swelling] : no joint swelling [Grossly Normal Strength/Tone] : grossly normal strength/tone [No Rash] : no rash [Coordination Grossly Intact] : coordination grossly intact [No Focal Deficits] : no focal deficits [Normal Gait] : normal gait [Deep Tendon Reflexes (DTR)] : deep tendon reflexes were 2+ and symmetric [Normal Affect] : the affect was normal [Alert and Oriented x3] : oriented to person, place, and time [Normal Insight/Judgement] : insight and judgment were intact

## 2023-03-15 LAB
25(OH)D3 SERPL-MCNC: 26.5 NG/ML
ALBUMIN SERPL ELPH-MCNC: 4.7 G/DL
ALP BLD-CCNC: 53 U/L
ALT SERPL-CCNC: 33 U/L
ANION GAP SERPL CALC-SCNC: 12 MMOL/L
AST SERPL-CCNC: 25 U/L
BASOPHILS # BLD AUTO: 0.07 K/UL
BASOPHILS NFR BLD AUTO: 1.3 %
BILIRUB SERPL-MCNC: 1.3 MG/DL
BUN SERPL-MCNC: 13 MG/DL
CALCIUM SERPL-MCNC: 10 MG/DL
CHLORIDE SERPL-SCNC: 101 MMOL/L
CHOLEST SERPL-MCNC: 207 MG/DL
CO2 SERPL-SCNC: 25 MMOL/L
CREAT SERPL-MCNC: 1.04 MG/DL
EGFR: 87 ML/MIN/1.73M2
EOSINOPHIL # BLD AUTO: 0.22 K/UL
EOSINOPHIL NFR BLD AUTO: 3.9 %
ESTIMATED AVERAGE GLUCOSE: 103 MG/DL
GLUCOSE SERPL-MCNC: 89 MG/DL
HBA1C MFR BLD HPLC: 5.2 %
HCT VFR BLD CALC: 46.4 %
HDLC SERPL-MCNC: 59 MG/DL
HGB BLD-MCNC: 14.9 G/DL
IMM GRANULOCYTES NFR BLD AUTO: 0.4 %
LDLC SERPL CALC-MCNC: 135 MG/DL
LYMPHOCYTES # BLD AUTO: 1.9 K/UL
LYMPHOCYTES NFR BLD AUTO: 34 %
MAN DIFF?: NORMAL
MCHC RBC-ENTMCNC: 28.7 PG
MCHC RBC-ENTMCNC: 32.1 GM/DL
MCV RBC AUTO: 89.4 FL
MONOCYTES # BLD AUTO: 0.35 K/UL
MONOCYTES NFR BLD AUTO: 6.3 %
NEUTROPHILS # BLD AUTO: 3.03 K/UL
NEUTROPHILS NFR BLD AUTO: 54.1 %
NONHDLC SERPL-MCNC: 148 MG/DL
PLATELET # BLD AUTO: 266 K/UL
POTASSIUM SERPL-SCNC: 5.3 MMOL/L
PROT SERPL-MCNC: 7.8 G/DL
PSA FREE SERPL-MCNC: 0.09 NG/ML
RBC # BLD: 5.19 M/UL
RBC # FLD: 13.3 %
SODIUM SERPL-SCNC: 139 MMOL/L
TRIGL SERPL-MCNC: 67 MG/DL
TSH SERPL-ACNC: 1.78 UIU/ML
WBC # FLD AUTO: 5.59 K/UL

## 2023-03-16 LAB — ANA SER IF-ACNC: NEGATIVE

## 2023-03-21 ENCOUNTER — APPOINTMENT (OUTPATIENT)
Dept: RHEUMATOLOGY | Facility: CLINIC | Age: 53
End: 2023-03-21
Payer: COMMERCIAL

## 2023-03-21 ENCOUNTER — LABORATORY RESULT (OUTPATIENT)
Age: 53
End: 2023-03-21

## 2023-03-21 VITALS
WEIGHT: 202 LBS | BODY MASS INDEX: 26.77 KG/M2 | DIASTOLIC BLOOD PRESSURE: 85 MMHG | HEART RATE: 50 BPM | SYSTOLIC BLOOD PRESSURE: 138 MMHG | HEIGHT: 73 IN | OXYGEN SATURATION: 100 %

## 2023-03-21 PROCEDURE — 99205 OFFICE O/P NEW HI 60 MIN: CPT

## 2023-03-22 LAB
C3 SERPL-MCNC: 106 MG/DL
C4 SERPL-MCNC: 20 MG/DL
CH50 SERPL-MCNC: 60 U/ML
CREAT SPEC-SCNC: 33 MG/DL
CREAT/PROT UR: NORMAL RATIO
CRP SERPL-MCNC: <3 MG/L
ERYTHROCYTE [SEDIMENTATION RATE] IN BLOOD BY WESTERGREN METHOD: 4 MM/HR
PROT UR-MCNC: <4 MG/DL
RHEUMATOID FACT SER QL: <10 IU/ML

## 2023-03-22 NOTE — H&P PST ADULT - SKIN/BREAST
negative Terbinafine Counseling: Patient counseling regarding adverse effects of terbinafine including but not limited to headache, diarrhea, rash, upset stomach, liver function test abnormalities, itching, taste/smell disturbance, nausea, abdominal pain, and flatulence.  There is a rare possibility of liver failure that can occur when taking terbinafine.  The patient understands that a baseline LFT and kidney function test may be required. The patient verbalized understanding of the proper use and possible adverse effects of terbinafine.  All of the patient's questions and concerns were addressed.

## 2023-03-23 LAB
ANA SER IF-ACNC: NEGATIVE
DSDNA AB SER-ACNC: <12 IU/ML
THYROGLOB AB SERPL-ACNC: 72.8 IU/ML
THYROPEROXIDASE AB SERPL IA-ACNC: 110 IU/ML

## 2023-03-24 LAB
ALBUMIN MFR SERPL ELPH: 57.8 %
ALBUMIN SERPL-MCNC: 5 G/DL
ALBUMIN/GLOB SERPL: 1.4 RATIO
ALPHA1 GLOB MFR SERPL ELPH: 2.9 %
ALPHA1 GLOB SERPL ELPH-MCNC: 0.2 G/DL
ALPHA2 GLOB MFR SERPL ELPH: 7.3 %
ALPHA2 GLOB SERPL ELPH-MCNC: 0.6 G/DL
B-GLOBULIN MFR SERPL ELPH: 10 %
B-GLOBULIN SERPL ELPH-MCNC: 0.9 G/DL
CELIACPAN: NORMAL
CENTROMERE IGG SER-ACNC: <0.2 CD:130001892
CHROMATIN AB SERPL-ACNC: <0.2 AL
DEPRECATED KAPPA LC FREE/LAMBDA SER: 1.56 RATIO
ENA RNP AB SER IA-ACNC: 0.4 AL
ENA SCL70 IGG SER IA-ACNC: <0.2 AL
ENA SM AB SER IA-ACNC: <0.2 AL
ENA SS-A AB SER IA-ACNC: <0.2 AL
ENA SS-B AB SER IA-ACNC: <0.2 AL
GAMMA GLOB FLD ELPH-MCNC: 1.9 G/DL
GAMMA GLOB MFR SERPL ELPH: 22 %
HISTONE AB SER QL: 1.3 UNITS
IGA SER QL IEP: 263 MG/DL
IGG SER QL IEP: 1891 MG/DL
IGM SER QL IEP: 43 MG/DL
INTERPRETATION SERPL IEP-IMP: NORMAL
KAPPA LC CSF-MCNC: 1.85 MG/DL
KAPPA LC SERPL-MCNC: 2.89 MG/DL
M PROTEIN SPEC IFE-MCNC: NORMAL
PROT SERPL-MCNC: 8.6 G/DL
PROT SERPL-MCNC: 8.6 G/DL
RNA POLYMERASE III IGG: 7 UNITS

## 2023-03-24 NOTE — HISTORY OF PRESENT ILLNESS
[FreeTextEntry1] : wife = sarah washington ID\par \par Mr. Landrum is a non-smoker with a PMHx Graves (s/p ablation), Howard's, RANI here for RP\par \par Has had color change in the fingers for a few years, usually in the fingers to the first or second knuckle and most often the middle finger.   Associated with cold exposure, but also to stress.   Usually experiences pallor and redness - not blue \par - denies ulceration or pitting \par - pain not terrible and quickly warms up without difficulty \par - started during the pandemic with hiking \par - hasn’t noticed it in the toes \par - doesn’t need med for it\par \par Rheum ROS \par - denies RP, sicca, oral ulcers, rashes, photosensitivity.   \par - denies skin tightening, ulcerations, nodules\par - denies constitutional symptoms, fatigue, night sweats.  weight is stable \par - Denies psoriasis, IBD, Inflammatory eye disease, STD, infectious diarrhea\par - breathes well without h/o of pleuritis, pericarditis.  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  was a  in [place] for years, 2 kids, hikes, \par \par \par

## 2023-03-24 NOTE — ASSESSMENT
[FreeTextEntry1] : wife = sarah washington ID\par \par Mr. Landrum is a non-smoker with a PMHx Graves (s/p ablation), Howard's, RANI here for RP\par \par #RP\par likely primary though given the context of aiCTD, adult onset and male gender agree with workup for aiCTD.  no s/s of aiCTD at this time\par -- check inflammatory markers \par -- check serologies/sub- serologies to r/o secondary causes\par -- discussed goals of therapy to improve aol and prevent tissue loss (ie, ulceration, gangrene).  \par -- discussed potential triggers (including cold, stress, smoke) as well as nonpharmacologic measures to help \par -- avoidance of cold exposure as well as sudden temperature changes (circumstances that can trigger an attack include air conditioned environments, holding an ice-cold drink, working with cold foods such as making meatballs or washing salad, seasonal changes such as cool rainy days. \par -- maintain whole body as well as digital warmth such as hand warmers, socks, layered clothing, hats, thermal underwear.  Can heat up clothing in a dryer and put them on while they are still warm before entering a cold environment.\par -- avoidance of vasoconstricting drugs when possible such as OTC nasal decongestants, diet pills, amphetamines, some headache meds\par -- t/c pharmacologic therapy should these measures fail or sxs worsen.\par \par More than 50% of the encounter was spent counseling the patient on differential, workup, disease course and treatment/management.  Education was provided to the patient during this encounter.  All questions and concerns were addressed and answered.   The patient verbalized understanding and agreed to the plan. \par \par Patient has been instructed to call for an appointment if new symptoms develop.\par Patient has been instructed to make a followup appointment in 3 weeks\par \par Time spent on the encounter included, but is not limited to, preparing to see the patient, obtaining and/or reviewing separately obtained history, performing the evaluation, counseling and educating, independently interpreting results with communication to patient, order placement, referring and/or communicating with other health professionals as described, and documenting clinical information in the electronic health record\par \par

## 2023-04-11 LAB
EJ AB SER QL: NEGATIVE
ENA JO1 AB SER IA-ACNC: <20 UNITS
ENA PM/SCL AB SER-ACNC: <20 UNITS
ENA SM+RNP AB SER IA-ACNC: <20 UNITS
ENA SS-A IGG SER QL: <20 UNITS
FIBRILLARIN AB SER QL: NEGATIVE
KU AB SER QL: NEGATIVE
MDA-5 (P140)(CADM-140): <20 UNITS
MI2 AB SER QL: NEGATIVE
NXP-2 (P140): <20 UNITS
OJ AB SER QL: NEGATIVE
PL12 AB SER QL: NEGATIVE
PL7 AB SER QL: NEGATIVE
SRP AB SERPL QL: NEGATIVE
TIF GAMMA (P155/140): <20 UNITS
U2 SNRNP AB SER QL: NEGATIVE

## 2023-05-09 LAB — ANTI-NUCLEAR ANTIBODIES - PRIMARY PATTERN TITER: NORMAL

## 2023-05-26 ENCOUNTER — RX RENEWAL (OUTPATIENT)
Age: 53
End: 2023-05-26

## 2023-06-29 ENCOUNTER — APPOINTMENT (OUTPATIENT)
Dept: PULMONOLOGY | Facility: CLINIC | Age: 53
End: 2023-06-29

## 2023-07-06 ENCOUNTER — NON-APPOINTMENT (OUTPATIENT)
Age: 53
End: 2023-07-06

## 2023-07-13 ENCOUNTER — APPOINTMENT (OUTPATIENT)
Dept: PULMONOLOGY | Facility: CLINIC | Age: 53
End: 2023-07-13
Payer: COMMERCIAL

## 2023-07-13 DIAGNOSIS — G47.33 OBSTRUCTIVE SLEEP APNEA (ADULT) (PEDIATRIC): ICD-10-CM

## 2023-07-13 PROCEDURE — 99203 OFFICE O/P NEW LOW 30 MIN: CPT | Mod: 95

## 2023-07-13 NOTE — HISTORY OF PRESENT ILLNESS
[Never] : never [Obstructive Sleep Apnea] : obstructive sleep apnea [Home] : home [TextBox_4] : This is a 52-year-old male with significant past medical history of obstructive sleep apnea who comes in to reestablish care.  He has been diagnosed with obstructive sleep apnea since 2009 and has been using his CPAP machine every day.  He is currently using a DreamStation 1 and recently got it replaced due to the recall.  He continues to benefit from it.  He is interested in obtaining a travel CPAP as he is going away on vacation and does not want to bring his device with him. [TextBox_100] : 2018 [TextBox_108] : 5.9 [TextBox_112] : 1.0 [TextBox_116] : 84 [ESS] : 0

## 2023-07-13 NOTE — REASON FOR VISIT
[Home] : at home, [unfilled] , at the time of the visit. [Medical Office: ([facility])___] : at the medical office located in  [Initial] : an initial visit [Sleep Apnea] : sleep apnea

## 2023-07-13 NOTE — PHYSICAL EXAM
[No Acute Distress] : no acute distress [No Deformities] : no deformities [Oriented x3] : oriented x3 [Normal Affect] : normal affect

## 2023-07-13 NOTE — ASSESSMENT
[FreeTextEntry1] : This is a 52-year-old male with significant past medical history of obstructive sleep apnea who comes in to reestablish care.\par \par #Obstructive sleep apnea–patient was found to have mild obstructive sleep apnea back in 2009 and has been compliant with his device since then.  He continues to be compliant with his device and is continuing to derive benefit from it.  He is interested in a travel CPAP and we discussed different ways of obtaining 1, but I suggested to go through [facility].  If he is unable to do this, then he can go through [email or web address].  He is eligible for a new device but declines at this time.  He understands that a travel CPAP will not be covered by insurance.\par \par Patient can follow-up in 6 to 12 months or earlier if needed.

## 2023-07-27 ENCOUNTER — APPOINTMENT (OUTPATIENT)
Dept: RHEUMATOLOGY | Facility: CLINIC | Age: 53
End: 2023-07-27
Payer: COMMERCIAL

## 2023-07-27 DIAGNOSIS — I73.00 RAYNAUD'S SYNDROME W/OUT GANGRENE: ICD-10-CM

## 2023-07-27 PROCEDURE — 99214 OFFICE O/P EST MOD 30 MIN: CPT | Mod: 95

## 2023-07-27 NOTE — PHYSICAL EXAM
[General Appearance - Alert] : alert [General Appearance - In No Acute Distress] : in no acute distress [FreeTextEntry1] : right IP joint didn’t appear swollen - and no dactlitis [Oriented To Time, Place, And Person] : oriented to person, place, and time [Impaired Insight] : insight and judgment were intact [Affect] : the affect was normal

## 2023-07-27 NOTE — ASSESSMENT
[FreeTextEntry1] : wife = sarah washington ID\par \par Mr. Landrum is a non-smoker with a PMHx Graves (s/p ablation), Howard's, RANI here for RP\par \par #RP\par likely primary though given the context of aiCTD, adult onset and male gender agree with workup for aiCTD.  no s/s of aiCTD at this time\par -- reviewed b/w - AISHA positive but also has multiple RAIN.  histoen borderline\par -- doubt thumb pain related - observe for now - non-inflammatory at this time \par -- check every 1- 2 years for evolution\par -- t/c pharmacologic therapy should these measures fail or sxs worsen.\par \par # Graves disease \par does have positive thyroid peroxidase ab - which can also be seen with Graves, (in addition to the pathogenically linnked TSHR ab TSI).  These ab can also be related / associated with the AISHA\par \par \par \par More than 50% of the encounter was spent counseling the patient on differential, workup, disease course and treatment/management.  Education was provided to the patient during this encounter.  All questions and concerns were addressed and answered.   The patient verbalized understanding and agreed to the plan. \par \par Patient has been instructed to call for an appointment if new symptoms develop.\par Patient has been instructed to make a followup appointment in 3 weeks\par \par Time spent on the encounter included, but is not limited to, preparing to see the patient, obtaining and/or reviewing separately obtained history, performing the evaluation, counseling and educating, independently interpreting results with communication to patient, order placement, referring and/or communicating with other health professionals as described, and documenting clinical information in the electronic health record\par \par

## 2023-07-27 NOTE — HISTORY OF PRESENT ILLNESS
[Home] : at home, [unfilled] , at the time of the visit. [Other Location: e.g. Home (Enter Location, City,State)___] : at [unfilled] [Verbal consent obtained from patient] : the patient, [unfilled] [FreeTextEntry1] : wife = sarah washington ID\par \par Mr. Landrum is a non-smoker with a PMHx Graves (s/p ablation), Howard's, RANI here for RP\par \par Background / presentation\par Has had color change in the fingers for a few years, usually in the fingers to the first or second knuckle and most often the middle finger.   Associated with cold exposure, but also to stress.   Usually experiences pallor and redness - not blue \par - denies ulceration or pitting \par - pain not terrible and quickly warms up without difficulty \par - started during the pandemic with hiking \par - hasn’t noticed it in the toes \par - doesn’t need med for it\par \par \par INTERVAL Hx\par - has been well in general \par - RP is well controlled now  - stress worsens it \par - father  at age [age].   bewteen 61 and 81 though had celiac sprue. mom passed at age 77 but she had mentioned she had an rice allergy. \par - occasionally gets thumb soreness - no swelling \par \par

## 2023-08-01 ENCOUNTER — RX RENEWAL (OUTPATIENT)
Age: 53
End: 2023-08-01

## 2023-08-24 ENCOUNTER — APPOINTMENT (OUTPATIENT)
Dept: INTERNAL MEDICINE | Facility: CLINIC | Age: 53
End: 2023-08-24
Payer: COMMERCIAL

## 2023-08-24 VITALS
TEMPERATURE: 97.6 F | BODY MASS INDEX: 25.58 KG/M2 | SYSTOLIC BLOOD PRESSURE: 130 MMHG | HEART RATE: 70 BPM | OXYGEN SATURATION: 98 % | HEIGHT: 73 IN | WEIGHT: 193 LBS | DIASTOLIC BLOOD PRESSURE: 85 MMHG

## 2023-08-24 DIAGNOSIS — R10.13 EPIGASTRIC PAIN: ICD-10-CM

## 2023-08-24 DIAGNOSIS — R74.8 ABNORMAL LEVELS OF OTHER SERUM ENZYMES: ICD-10-CM

## 2023-08-24 DIAGNOSIS — K22.70 BARRETT'S ESOPHAGUS W/OUT DYSPLASIA: ICD-10-CM

## 2023-08-24 PROCEDURE — 36415 COLL VENOUS BLD VENIPUNCTURE: CPT

## 2023-08-24 PROCEDURE — 99213 OFFICE O/P EST LOW 20 MIN: CPT | Mod: 25

## 2023-08-24 RX ORDER — VALACYCLOVIR 500 MG/1
500 TABLET, FILM COATED ORAL
Qty: 90 | Refills: 0 | Status: COMPLETED | COMMUNITY
Start: 2017-03-14 | End: 2023-08-24

## 2023-08-24 NOTE — HISTORY OF PRESENT ILLNESS
[de-identified] : 52 y/o man with h/o Raynauds, Graves s/p ablation, RANI, Barretts presents for f/u and medication management. No complaints.

## 2023-08-24 NOTE — PHYSICAL EXAM
[No Acute Distress] : no acute distress [Normal Sclera/Conjunctiva] : normal sclera/conjunctiva [Thyroid Normal, No Nodules] : the thyroid was normal and there were no nodules present [Normal] : affect was normal and insight and judgment were intact

## 2023-08-25 LAB — TSH SERPL-ACNC: 0.2 UIU/ML

## 2024-01-31 ENCOUNTER — NON-APPOINTMENT (OUTPATIENT)
Age: 54
End: 2024-01-31

## 2024-01-31 ENCOUNTER — APPOINTMENT (OUTPATIENT)
Dept: OPHTHALMOLOGY | Facility: CLINIC | Age: 54
End: 2024-01-31
Payer: COMMERCIAL

## 2024-01-31 PROCEDURE — 92014 COMPRE OPH EXAM EST PT 1/>: CPT

## 2024-04-27 RX ORDER — PANTOPRAZOLE 20 MG/1
20 TABLET, DELAYED RELEASE ORAL
Qty: 30 | Refills: 5 | Status: ACTIVE | COMMUNITY
Start: 1900-01-01 | End: 1900-01-01

## 2024-05-17 ENCOUNTER — RX RENEWAL (OUTPATIENT)
Age: 54
End: 2024-05-17

## 2024-05-25 ENCOUNTER — EMERGENCY (EMERGENCY)
Facility: HOSPITAL | Age: 54
LOS: 1 days | Discharge: HOME | End: 2024-05-25
Attending: EMERGENCY MEDICINE
Payer: COMMERCIAL

## 2024-05-25 VITALS
DIASTOLIC BLOOD PRESSURE: 81 MMHG | HEIGHT: 74 IN | TEMPERATURE: 98 F | OXYGEN SATURATION: 99 % | WEIGHT: 194.01 LBS | HEART RATE: 74 BPM | RESPIRATION RATE: 20 BRPM | SYSTOLIC BLOOD PRESSURE: 135 MMHG

## 2024-05-25 DIAGNOSIS — Z98.890 OTHER SPECIFIED POSTPROCEDURAL STATES: Chronic | ICD-10-CM

## 2024-05-25 DIAGNOSIS — Z90.49 ACQUIRED ABSENCE OF OTHER SPECIFIED PARTS OF DIGESTIVE TRACT: Chronic | ICD-10-CM

## 2024-05-25 PROCEDURE — 99282 EMERGENCY DEPT VISIT SF MDM: CPT

## 2024-05-25 PROCEDURE — 99284 EMERGENCY DEPT VISIT MOD MDM: CPT

## 2024-05-25 NOTE — ED PROVIDER NOTE - NSFOLLOWUPINSTRUCTIONS_ED_ALL_ED_FT
YOU WERE SEEN IN THE ED FOR: concern for possible plastic foreign body ingestion (fork alis)     WHILE YOU WERE HERE, YOU HAD: an evaluation by the GI attending, Dr. Rob, who offered you endoscopy.  At this time, you declined endoscopy and decided to monitor your symptoms at home.  Please follow up with Dr. Rob in the office this week.  Please call on Tuesday 5/28/24 to set up a follow up appointment.      AS DISCUSSED, IF YOU SO WISH, YOU CAN STOOL INTO A HAT THAT HAS BEEN PROVIDED FOR YOU TO SEE IF YOU CAN SEE THE FOREIGN OBJECT.    PLEASE ALSO FOLLOW UP WITH YOUR PRIVATE PHYSICIAN WITHIN THE NEXT 72  HOURS. BRING COPIES OF YOUR DISCHARGE INSTRUCTIONS.    RETURN TO THE EMERGENCY DEPARTMENT IF YOU EXPERIENCE ANY NEW/CONCERNING/WORSENING SYMPTOMS SUCH AS BUT NOT LIMITED TO: abdominal pain (as you report having none now), bloody or black stools, nausea, vomiting, fevers, chills, or any other concerns

## 2024-05-25 NOTE — ED ADULT TRIAGE NOTE - WEIGHT IN LBS
194 Consent (Scalp)/Introductory Paragraph: The rationale for Mohs was explained to the patient and consent was obtained. The risks, benefits and alternatives to therapy were discussed in detail. Specifically, the risks of changes in hair growth pattern secondary to repair, infection, scarring, bleeding, prolonged wound healing, incomplete removal, allergy to anesthesia, nerve injury and recurrence were addressed. Prior to the procedure, the treatment site was clearly identified and confirmed by the patient. All components of Universal Protocol/PAUSE Rule completed.

## 2024-05-25 NOTE — ED ADULT NURSE NOTE - OBJECTIVE STATEMENT
52 y/o male came to the ED s/p swallowing a piece of a plastic fork. Speaking clearly. Able to clear secretions. No pain or discomfort. Respirations even an unlabored. Denies CP, SOB, nausea, vomiting, cough, discomfort.

## 2024-05-25 NOTE — CONSULT NOTE ADULT - ASSESSMENT
Impression:  1. Accidental foreign body ingestion - ~2cm plastic prong of a fork approximately 3.5 hours ago    Plan:  -Discussed with patient and his wife at bedside extensively.  Explained that most objects of this size will eventually passed through the GI tract.  -Explained that we can attempt upper endoscopy for removal, but the longer he has been since he ingested the object, the less likelihood that he will remain in the stomach as it will likely pass into the small bowel.  -Explained that objects usually less than 5 cm unable to make his way around the duodenal sweep. Explained that the risk of complications such as obstruction/perforation/retention is small as per literature  -Explained the risk and benefits of upper endoscopy.  Explained the risk of bleeding, infection, perforation, and explained that it must be general anesthesia given that he has a full stomach to protect his lungs and minimize the risk of aspiration. Called the OR about availability, and discussed timing of the procedure with patient as it will be today but not immediate as there are cases running in the OR still  -The patient declined endoscopy at this time after being explained the risk and the benefits  -Explained expectant management and management.  He can continue diet as tolerated, asked him to monitor for signs of obstruction, pain, or signs of bleeding.  -X-ray or CT is unlikely to be helpful, as the object is non-metallic and small and unlikely to be seen  -D/w ER attending    Olegario (Storm Conley) MD Darron  GI e-mail: [email or web address] Impression:  1. Accidental foreign body ingestion - ~2cm plastic prong of a fork approximately 3.5 hours ago    Plan:  -Discussed with patient and his wife at bedside extensively.  Explained that most objects of this size will eventually passed through the GI tract.  -Explained that we can attempt upper endoscopy for removal, but the longer he has been since he ingested the object, the less likelihood that he will remain in the stomach as it will likely pass into the small bowel.  -Explained that objects usually less than 5 cm unable to make his way around the duodenal sweep. Explained that the risk of complications such as obstruction/perforation/retention is small as per literature  -Offered EGD. Explained the risk and benefits of upper endoscopy.  Explained the risk of bleeding, infection, perforation, and explained that it must be general anesthesia given that he has a full stomach to protect his lungs and minimize the risk of aspiration. Called the OR about availability, and discussed timing of the procedure with patient as it will be today but not immediate as there are cases running in the OR still  -The patient declined endoscopy at this time after being explained the risk and the benefits  -Explained expectant management and management.  He can continue diet as tolerated, asked him to monitor for signs of obstruction, pain, or signs of bleeding.  -X-ray or CT is unlikely to be helpful, as the object is non-metallic and small and unlikely to be seen  -D/w ER attending    Olegario (Storm Conley) MD [place]  GI e-mail: [email or web address]

## 2024-05-25 NOTE — ED PROVIDER NOTE - ATTENDING CONTRIBUTION TO CARE
Attending MD Mora: I personally have seen and examined this patient.  Resident note reviewed and agree on plan of care and except where noted.  See below for details.     Seen in [place] 53, accompanied by attending Dr. Chen    Patient initially reluctant to speak to this MD as reported had already spoken to GI attending.  Explained was ER attending, moved patient into room as reported preferred not to be in stretcher.        53M with PMH/PSH including Graves, GERD, s/p lap ronalod presents to the ED with concern for foreign body ingestion. [place] he was eating and noted a alis to be missing on his plastic fork.  Reports does not remember chewing into it but was eating tortillas which are also crispy.  Reports is not positive he swallowed alis, may have fallen on floor.  Reports has no abdominal pain.  Denies vomiting.  Reports was already seen by GI attending, Dr. Rob, and was deciding whether or not to have endoscopy.      Exam:   General: NAD  HENT: head NCAT, airway patent  Eyes: anicteric, no conjunctival injection   Lungs: lungs CTAB, no increased work of breathing  Cardiac: +S1S2, no obvious m/r/g  GI: abdomen soft with +BS, NT, ND, well healed surgical incisions  MSK: ranging neck and extremities freely  Neuro: moving all extremities spontaneously, nonfocal, ambulating freely  Psych: normal mood and affect     A/P: 53M with concern for foreign body ingestion, discussed possible XR would eval for free air/obstructive air fluid level pattern but low suspicion, amenable but will make final decision, will discuss with GI

## 2024-05-25 NOTE — ED PROVIDER NOTE - PROGRESS NOTE DETAILS
Attending MD Mora: Discussed possibility of endoscopy with Dr. Rob in room and with patient and Dr. Chen.  Risks, benefits, alternatives discussed with patient and loved one.  Verbalized understanding.  Patient declined endoscopy at this time.  Discussion with Dr. Rob, suspect XR will add little, will not obtain. Discussed Emergency Department return precautions.  Will follow up with Dr. Rob in office this week.  Stable for discharge.  Follow up instructions given, importance of follow up emphasized, return to ED parameters reviewed and patient verbalized understanding.  All questions answered, all concerns addressed.

## 2024-05-25 NOTE — ED PROVIDER NOTE - PATIENT PORTAL LINK FT
You can access the FollowMyHealth Patient Portal offered by [facility] by registering at the following website: [email or web address]. By joining [employer]’s FollowMyHealth portal, you will also be able to view your health information using other applications (apps) compatible with our system.

## 2024-05-25 NOTE — ED PROVIDER NOTE - CARE PROVIDER_API CALL
Storm Rob  Gastroenterology  [address] [place] [id number]  Phone: [phone]  Fax: [phone]  Follow Up Time: 1-3 Days

## 2024-05-25 NOTE — ED ADULT NURSE NOTE - NSFALLUNIVINTERV_ED_ALL_ED
Bed/Stretcher in lowest position, wheels locked, appropriate side rails in place/Call bell, personal items and telephone in reach/Instruct patient to call for assistance before getting out of bed/chair/stretcher/Non-slip footwear applied when patient is off stretcher/Effie to call system/Physically safe environment - no spills, clutter or unnecessary equipment/Purposeful proactive rounding/Room/bathroom lighting operational, light cord in reach

## 2024-05-25 NOTE — CONSULT NOTE ADULT - SUBJECTIVE AND OBJECTIVE BOX
Chief Complaint:  Patient is a 53y old  Male who presents with a chief complaint of foreign body ingestion    HPI: This is a 53-year-old male with a history of hypothyroidism who presents for foreign body ingestion.  The patient was at the [facility] earlier today, and around 12:30PM, he accidentally swallowed 1 prong of a plastic fork.  The plan was about 2 cm.  Since ingestion, and at the time of the ingestion, he ingested a large salad at the same time.  Since then, he denies any abdominal pain, no odynophagia, and he has no chest pain.  He does not feel that anything is stuck. No nausea/vomiting. He had a bowel movement subsequently that was normal.  No prior impactions.    Allergies:  No Known Allergies      Home Medications:    Hospital Medications:      PMHX/PSHX:  GERD (gastroesophageal reflux disease)    Hypothyroidism    RANI on CPAP    H/O left wrist surgery    History of ankle surgery    History of appendectomy        Family history:  Family history of valvular heart disease (Father)    Family history of ischemic bowel disease (Mother)        Social History: No illict substances    ROS:     General:  No wt loss, fevers, chills, night sweats, fatigue,   Eyes:  Good vision, no reported pain  ENT:  No sore throat, pain, runny nose, dysphagia  CV:  No pain, palpitations, hypo/hypertension  Resp:  No dyspnea, cough, tachypnea, wheezing  GI:  See HPI  :  No pain, bleeding, incontinence, nocturia  Muscle:  No pain, weakness  Neuro:  No weakness, tingling, memory problems  Psych:  No fatigue, insomnia, mood problems, depression  Endocrine:  No polyuria, polydipsia, cold/heat intolerance  Skin:  No rash, edema      PHYSICAL EXAM:     GENERAL:  Appears stated age, well-groomed, well-nourished, no distress  HEENT:  NC/AT,  conjunctivae clear and pink,  no JVD,   CHEST:  Full & symmetric excursion, no increased effort, breath sounds clear  HEART:  Regular rhythm, S1, S2   ABDOMEN:  Soft, non-tender, non-distended   EXTREMITIES:  no cyanosis,clubbing or edema  SKIN:  No rash/erythema   NEURO:  Alert, oriented x 3    Vital Signs:  Vital Signs Last 24 Hrs  T(C): 36.4 (25 May 2024 14:51), Max: 36.4 (25 May 2024 14:51)  T(F): 97.5 (25 May 2024 14:51), Max: 97.5 (25 May 2024 14:51)  HR: 74 (25 May 2024 14:51) (74 - 74)  BP: 135/81 (25 May 2024 14:51) (135/81 - 135/81)  BP(mean): --  RR: 20 (25 May 2024 14:51) (20 - 20)  SpO2: 99% (25 May 2024 14:51) (99% - 99%)    Parameters below as of 25 May 2024 14:51  Patient On (Oxygen Delivery Method): room air      Daily Height in cm: 187.96 (25 May 2024 14:51)    Daily     LABS:                    Imaging:

## 2024-05-29 ENCOUNTER — APPOINTMENT (OUTPATIENT)
Dept: INTERNAL MEDICINE | Facility: CLINIC | Age: 54
End: 2024-05-29
Payer: COMMERCIAL

## 2024-05-29 VITALS
SYSTOLIC BLOOD PRESSURE: 117 MMHG | BODY MASS INDEX: 25.58 KG/M2 | TEMPERATURE: 97.7 F | WEIGHT: 193 LBS | HEART RATE: 99 BPM | DIASTOLIC BLOOD PRESSURE: 71 MMHG | HEIGHT: 73 IN | OXYGEN SATURATION: 97 %

## 2024-05-29 DIAGNOSIS — E89.0 POSTPROCEDURAL HYPOTHYROIDISM: ICD-10-CM

## 2024-05-29 DIAGNOSIS — Z23 ENCOUNTER FOR IMMUNIZATION: ICD-10-CM

## 2024-05-29 DIAGNOSIS — K21.9 GASTRO-ESOPHAGEAL REFLUX DISEASE W/OUT ESOPHAGITIS: ICD-10-CM

## 2024-05-29 PROCEDURE — 99214 OFFICE O/P EST MOD 30 MIN: CPT

## 2024-05-29 PROCEDURE — 36415 COLL VENOUS BLD VENIPUNCTURE: CPT

## 2024-05-29 PROCEDURE — G2211 COMPLEX E/M VISIT ADD ON: CPT

## 2024-05-29 RX ORDER — PNV NO.95/FERROUS FUM/FOLIC AC 28MG-0.8MG
TABLET ORAL
Refills: 0 | Status: ACTIVE | COMMUNITY

## 2024-05-29 RX ORDER — UBIDECARENONE/VIT E ACET 100MG-5
CAPSULE ORAL
Refills: 0 | Status: ACTIVE | COMMUNITY

## 2024-05-29 RX ORDER — LEVOTHYROXINE SODIUM 175 UG/1
175 TABLET ORAL
Qty: 105 | Refills: 1 | Status: ACTIVE | COMMUNITY
Start: 2018-12-13 | End: 1900-01-01

## 2024-05-29 RX ORDER — LEVOTHYROXINE SODIUM 0.17 MG/1
175 TABLET ORAL
Qty: 30 | Refills: 0 | Status: COMPLETED | COMMUNITY
Start: 2023-05-26 | End: 2024-05-29

## 2024-05-29 NOTE — HEALTH RISK ASSESSMENT
[0] : 2) Feeling down, depressed, or hopeless: Not at all (0) [PHQ-2 Negative - No further assessment needed] : PHQ-2 Negative - No further assessment needed [Never] : Never [[id number]] : 0

## 2024-05-29 NOTE — HISTORY OF PRESENT ILLNESS
[de-identified] : 54 y/o man with h/o Raynauds, Graves s/p ablation, RANI, Barretts presents for f/u and medication management. No complaints.

## 2024-05-30 LAB
TSH SERPL-ACNC: 0.8 UIU/ML
VIT B12 SERPL-MCNC: 884 PG/ML

## 2024-11-15 RX ORDER — PANTOPRAZOLE 20 MG/1
20 TABLET, DELAYED RELEASE ORAL
Qty: 90 | Refills: 3 | Status: ACTIVE | COMMUNITY
Start: 2024-11-15 | End: 1900-01-01

## 2025-01-29 ENCOUNTER — APPOINTMENT (OUTPATIENT)
Dept: OPHTHALMOLOGY | Facility: CLINIC | Age: 55
End: 2025-01-29
Payer: COMMERCIAL

## 2025-01-29 ENCOUNTER — NON-APPOINTMENT (OUTPATIENT)
Age: 55
End: 2025-01-29

## 2025-01-29 PROCEDURE — 92014 COMPRE OPH EXAM EST PT 1/>: CPT

## 2025-04-10 ENCOUNTER — APPOINTMENT (OUTPATIENT)
Dept: PULMONOLOGY | Facility: CLINIC | Age: 55
End: 2025-04-10
Payer: COMMERCIAL

## 2025-04-10 VITALS
BODY MASS INDEX: 26.11 KG/M2 | HEART RATE: 61 BPM | OXYGEN SATURATION: 97 % | TEMPERATURE: 98.1 F | WEIGHT: 197 LBS | SYSTOLIC BLOOD PRESSURE: 126 MMHG | HEIGHT: 73 IN | DIASTOLIC BLOOD PRESSURE: 79 MMHG | RESPIRATION RATE: 16 BRPM

## 2025-04-10 DIAGNOSIS — G47.33 OBSTRUCTIVE SLEEP APNEA (ADULT) (PEDIATRIC): ICD-10-CM

## 2025-04-10 PROCEDURE — G2211 COMPLEX E/M VISIT ADD ON: CPT

## 2025-04-10 PROCEDURE — 99214 OFFICE O/P EST MOD 30 MIN: CPT | Mod: GC

## 2025-05-23 RX ORDER — LEVOTHYROXINE SODIUM 0.17 MG/1
175 TABLET ORAL
Qty: 32 | Refills: 5 | Status: ACTIVE | COMMUNITY
Start: 2025-05-23 | End: 1900-01-01

## 2025-08-11 ENCOUNTER — NON-APPOINTMENT (OUTPATIENT)
Age: 55
End: 2025-08-11

## 2025-08-13 ENCOUNTER — APPOINTMENT (OUTPATIENT)
Dept: PULMONOLOGY | Facility: CLINIC | Age: 55
End: 2025-08-13
Payer: COMMERCIAL

## 2025-08-13 DIAGNOSIS — G47.33 OBSTRUCTIVE SLEEP APNEA (ADULT) (PEDIATRIC): ICD-10-CM

## 2025-08-13 PROCEDURE — G2211 COMPLEX E/M VISIT ADD ON: CPT | Mod: 95

## 2025-08-13 PROCEDURE — 99213 OFFICE O/P EST LOW 20 MIN: CPT | Mod: 95
